# Patient Record
Sex: FEMALE | Race: WHITE | NOT HISPANIC OR LATINO | Employment: OTHER | URBAN - METROPOLITAN AREA
[De-identification: names, ages, dates, MRNs, and addresses within clinical notes are randomized per-mention and may not be internally consistent; named-entity substitution may affect disease eponyms.]

---

## 2017-12-18 ENCOUNTER — OFFICE VISIT (OUTPATIENT)
Dept: RHEUMATOLOGY | Facility: CLINIC | Age: 82
End: 2017-12-18
Payer: MEDICARE

## 2017-12-18 VITALS
WEIGHT: 154 LBS | BODY MASS INDEX: 29.07 KG/M2 | DIASTOLIC BLOOD PRESSURE: 62 MMHG | HEIGHT: 61 IN | SYSTOLIC BLOOD PRESSURE: 180 MMHG

## 2017-12-18 DIAGNOSIS — M19.90 CHRONIC INFLAMMATORY ARTHRITIS: ICD-10-CM

## 2017-12-18 DIAGNOSIS — M62.59 MUSCLE WASTING AND ATROPHY, NOT ELSEWHERE CLASSIFIED, MULTIPLE SITES: ICD-10-CM

## 2017-12-18 DIAGNOSIS — M35.3 PMR (POLYMYALGIA RHEUMATICA): Primary | ICD-10-CM

## 2017-12-18 LAB
ALBUMIN SERPL-MCNC: 3.8 G/DL (ref 3.1–4.7)
ALP SERPL-CCNC: 80 IU/L (ref 40–104)
ALT (SGPT): 18 IU/L (ref 3–33)
AST SERPL-CCNC: 18 IU/L (ref 10–40)
BASOPHILS NFR BLD: 0 K/UL (ref 0–0.2)
BASOPHILS NFR BLD: 0.3 %
BILIRUB SERPL-MCNC: 0.7 MG/DL (ref 0.3–1)
BUN SERPL-MCNC: 48 MG/DL (ref 8–20)
CALCIUM SERPL-MCNC: 9.8 MG/DL (ref 7.7–10.4)
CHLORIDE: 95 MMOL/L (ref 98–110)
CO2 SERPL-SCNC: 26.1 MMOL/L (ref 22.8–31.6)
CREATININE: 0.97 MG/DL (ref 0.6–1.4)
CRP SERPL-MCNC: 1.4 MG/DL (ref 0–1.4)
EOSINOPHIL NFR BLD: 0 K/UL (ref 0–0.7)
EOSINOPHIL NFR BLD: 0.2 %
ERYTHROCYTE [DISTWIDTH] IN BLOOD BY AUTOMATED COUNT: 15.7 % (ref 11.7–14.9)
GLUCOSE: 303 MG/DL (ref 70–99)
GRAN #: 4.1 K/UL (ref 1.4–6.5)
GRAN%: 68.7 %
HCT VFR BLD AUTO: 32 % (ref 36–48)
HGB BLD-MCNC: 10.1 G/DL (ref 12–15)
IMMATURE GRANS (ABS): 0.3 K/UL (ref 0–1)
IMMATURE GRANULOCYTES: 4.7 %
LYMPH #: 1.2 K/UL (ref 1.2–3.4)
LYMPH%: 20.8 %
MCH RBC QN AUTO: 31.5 PG (ref 25–35)
MCHC RBC AUTO-ENTMCNC: 31.6 G/DL (ref 31–36)
MCV RBC AUTO: 99.7 FL (ref 79–98)
MONO #: 0.3 K/UL (ref 0.1–0.6)
MONO%: 5.3 %
NUCLEATED RBCS: 0 %
PLATELET # BLD AUTO: 156 K/UL (ref 140–440)
PMV BLD AUTO: 9.6 FL (ref 8.8–12.7)
POTASSIUM SERPL-SCNC: 4.8 MMOL/L (ref 3.5–5)
PROT SERPL-MCNC: 6.8 G/DL (ref 6–8.2)
RBC # BLD AUTO: 3.21 M/UL (ref 3.5–5.5)
SODIUM: 134 MMOL/L (ref 134–144)
T4 FREE SP9 P CHAL SERPL-SCNC: 1.22 NG/DL (ref 0.45–1.27)
TSH SERPL DL<=0.005 MIU/L-ACNC: 0.28 ULU/ML (ref 0.3–5.6)
URATE SERPL-MCNC: 5.7 MG/DL (ref 2.6–7.8)
WBC # BLD AUTO: 5.9 K/UL (ref 5–10)

## 2017-12-18 PROCEDURE — 99204 OFFICE O/P NEW MOD 45 MIN: CPT | Mod: ,,, | Performed by: INTERNAL MEDICINE

## 2017-12-18 RX ORDER — NAPROXEN SODIUM 220 MG/1
81 TABLET, FILM COATED ORAL EVERY MORNING
COMMUNITY

## 2017-12-18 RX ORDER — ERGOCALCIFEROL 1.25 MG/1
50000 CAPSULE ORAL
COMMUNITY
Start: 2017-10-19

## 2017-12-18 RX ORDER — LISINOPRIL 10 MG/1
TABLET ORAL
COMMUNITY
Start: 2017-09-21 | End: 2019-08-16

## 2017-12-18 RX ORDER — PREDNISONE 5 MG/1
5 TABLET ORAL 3 TIMES DAILY
Qty: 90 TABLET | Refills: 0 | Status: SHIPPED | OUTPATIENT
Start: 2017-12-18 | End: 2018-01-31

## 2017-12-18 RX ORDER — METHYLPREDNISOLONE 4 MG/1
TABLET ORAL
COMMUNITY
Start: 2017-10-23 | End: 2017-12-18

## 2017-12-18 RX ORDER — INDOMETHACIN 25 MG/1
CAPSULE ORAL
COMMUNITY
Start: 2017-10-23 | End: 2018-01-31

## 2017-12-18 RX ORDER — ALISKIREN HEMIFUMARATE 300 MG/1
TABLET, FILM COATED ORAL
COMMUNITY
Start: 2017-09-25 | End: 2019-08-16

## 2017-12-18 RX ORDER — SIMVASTATIN 20 MG/1
TABLET, FILM COATED ORAL
COMMUNITY
End: 2019-08-16

## 2017-12-18 RX ORDER — HYDROCODONE BITARTRATE AND ACETAMINOPHEN 5; 325 MG/1; MG/1
TABLET ORAL
COMMUNITY
Start: 2017-10-16 | End: 2017-12-18 | Stop reason: ALTCHOICE

## 2017-12-18 RX ORDER — LEVOTHYROXINE SODIUM 150 UG/1
150 TABLET ORAL EVERY MORNING
COMMUNITY

## 2017-12-18 RX ORDER — HYDROCHLOROTHIAZIDE 25 MG/1
TABLET ORAL
COMMUNITY
End: 2019-08-16

## 2017-12-18 RX ORDER — AMLODIPINE BESYLATE 5 MG/1
TABLET ORAL
COMMUNITY
End: 2019-08-16

## 2017-12-18 RX ORDER — ALISKIREN 300 MG/1
TABLET, FILM COATED ORAL
COMMUNITY
End: 2017-12-18 | Stop reason: SDUPTHER

## 2017-12-18 RX ORDER — ESOMEPRAZOLE MAGNESIUM 40 MG/1
40 CAPSULE, DELAYED RELEASE ORAL EVERY MORNING
COMMUNITY

## 2017-12-18 RX ORDER — SOTALOL HYDROCHLORIDE 80 MG/1
80 TABLET ORAL 2 TIMES DAILY
COMMUNITY
Start: 2017-10-20

## 2017-12-18 NOTE — PROGRESS NOTES
[facility] RHEUMATOLOGY           New patient visit      Subjective:       Patient ID:   NAME: Vee Pizano : 1933     84 y.o. female    Referring Doc: No ref. provider found  Other Physicians:    Chief Complaint:  Consult (new patient - referred by Dr. Joseph); Joint Pain (7/10 neck, right arm); and Extremity Weakness        HPI:   This patient has had approximately 6 months of progressive pain and weakness involving both the upper and lower extremities. The patient states that it began shortly after the insertion of a permanent pacemaker in 2017. She had acute attack of pain in 1 foot. There was no redness, warmth or swelling but very significant pain Someone postulated gout as the cause and the patient was given Indocin (!) but did not note any significant benefit from that. She did however seem to spontaneously recover from that episode but soon developed an attack of bilateral foot pain, again without signs of local inflammation. At this point she was treated with a Medrol Dosepak and noted an excellent response from that. Since then, however, she has been basically confined to bed due to what sounds like weakness in the lower extremities. She does get up to a limited extent but only with a walker. Today she pain in her right wrist and in her right shoulder with some degree of swelling in the wrist. She is unable to estimate when this change began. It is however making it difficult for her to manage the walker and thus it is increasing the extent to which she is bedridden.      ROS:   GEN:      no fever, night sweats or weight loss  SKIN:     no rashes , erythema, bruising, or swelling, no Raynauds, no photosensitivity  HEENT: occ HAs, no acute changes in vision, no mouth ulcers, no sicca symptoms, no scalp tenderness, jaw claudication.  CV:         no CP, SOB, PND, CARNEY or orthopnea, + palpitations  PULM:   no SOB, cough, hemoptysis, sputum or pleuritic pain  GI:         no abdominal pain,  "nausea, vomiting, constipation, diarrhea, melanotic stools, BRBPR, or hematemesis.no dysphagia  :       no hematuria, dysuria  NEURO :no paresthesias, +/- headaches, no visual disturbances, ++ muscle weakness  MUSCULOSKELETAL:  Muscle pain in both upper and lower extremities without proximal preference. Pain and swelling of the right wrist and possibly the right shoulder  PSYCH: No insomnia, no significant depression, no anxiety    Medications:    Current Outpatient Prescriptions:     amLODIPine (NORVASC) 5 MG tablet, Take by mouth., Disp: , Rfl:     apixaban (ELIQUIS) 5 mg Tab, Take by mouth., Disp: , Rfl:     aspirin 81 MG Chew, Take by mouth., Disp: , Rfl:     CYANOCOBALAMIN, VITAMIN B-12, (VITAMIN B-12 ORAL), Take by mouth., Disp: , Rfl:     esomeprazole (NEXIUM) 40 MG capsule, Take by mouth., Disp: , Rfl:     hydroCHLOROthiazide (HYDRODIURIL) 25 MG tablet, Take by mouth., Disp: , Rfl:     indomethacin (INDOCIN) 25 MG capsule, , Disp: , Rfl:     levothyroxine (SYNTHROID) 125 MCG tablet, Take by mouth., Disp: , Rfl:     lisinopril 10 MG tablet, , Disp: , Rfl:     OXYBUTYNIN CHLORIDE ORAL, Take by mouth., Disp: , Rfl:     simvastatin (ZOCOR) 20 MG tablet, Take by mouth., Disp: , Rfl:     sotalol (BETAPACE) 80 MG tablet, , Disp: , Rfl:     TEKTURNA 300 mg Tab, , Disp: , Rfl:     VITAMIN D2 50,000 unit capsule, , Disp: , Rfl:     predniSONE (DELTASONE) 5 MG tablet, Take 1 tablet (5 mg total) by mouth 3 (three) times daily., Disp: 90 tablet, Rfl: 0    FAMILY HISTORY: negative for Connective Tissue Disease        Review of patient's allergies indicates:  No Known Allergies          Objective:     Vitals:  Blood pressure (!) 180/62, height 5' 1" (1.549 m), weight 69.9 kg (154 lb).    Physical Examination:   GEN:    wn/wd female in no apparent distress  SKIN:    no rashes, no lesions, no sclerodactyly, no Raynaud's, no periungual erythema  HEAD:   no alopecia, no scalp tenderness, no temporal artery " tenderness or induration.  EYES:   no pallor, no icterus, PERRLA  ENT:     no thrush, no mucosal dryness or ulcerations, adequate oral hygiene & dentition.  NECK:  supple x 6, no masses, no thyromegaly, no lymphadenopathy.  CV:        S1 and S2 regular, no murmurs, gallop or rubs  CHEST: Normal respiratory effort;  normal breath sounds/no adventitious sounds. No signs of consolidation.  ABD:      non-tender and non-distended; soft; normal bowel sounds; no rebound/guarding or tenderness. No hepatosplenomegaly.  Musculoskeletal:  Evidence of synovial proliferation is seen in the right second and third MCP joints with very mild tenderness on compression. There is no active synovitis in the small joints of the hands. Interosseous atrophy is prominent on the dorsum of both hands. The right wrist demonstrates significant chronic synovial proliferation without redness, warmth or swelling. There is a decrease in the range of motion of the right wrist. Range of motion of the elbows is full and there are no nodules noted. There is mild warmth and moderate crepitus on range of motion of the right shoulder though it is full. There is some spasm of the right trapezius. The impingement sign and the drop arm sign are both negative bilaterally. The examination of the lower extremities was limited to the patient being seated in a wheelchair. There is no obvious sign of hip disease. The left knee appears to be healing well from a recent left TKR. There is no warmth noted in the ankles. There is mild metatarsalgia present on the right The Homans signs are negative bilaterally. Strength in the upper extremities is 4+/5x2 while strength in the lower extremities is 3/5×2. I was unable to study posture, range of motion of the back or gait.  EXTREM: no clubbing or cyanosis, ++ bipedal edema. normal pulses.  NEURO: grossly intact; motor/sensory WNL; AAOx3; no tremors  PSYCH:  normal mood, affect and behavior            Labs:   Lab Results    Component Value Date    WBC 4.89 03/06/2008    HGB 10.4 (L) 03/06/2008    HCT 32.6 (L) 03/06/2008    MCV 94.5 03/06/2008     03/06/2008   CMP@  Sodium   Date Value Ref Range Status   09/25/2008 142 136 - 145 mMol/l Final     Potassium   Date Value Ref Range Status   09/25/2008 4.0 3.5 - 5.1 mMol/l Final     Chloride   Date Value Ref Range Status   09/25/2008 103 95 - 110 mMol/l Final     CO2   Date Value Ref Range Status   09/25/2008 23 23.0 - 29.0 mEq/L Final     Glucose   Date Value Ref Range Status   09/25/2008 122 (H) 70 - 110 mg/dl Final     BUN, Bld   Date Value Ref Range Status   09/25/2008 34 (H) 8 - 23 mg/dl Final     Creatinine   Date Value Ref Range Status   09/25/2008 0.9 0.5 - 1.4 mg/dl Final     Calcium   Date Value Ref Range Status   09/25/2008 9.8 8.7 - 10.5 mg/dl Final     Total Protein   Date Value Ref Range Status   09/25/2008 7.3 6.0 - 8.4 gm/dl Final     Albumin   Date Value Ref Range Status   09/25/2008 4.7 3.5 - 5.2 g/dl Final     Total Bilirubin   Date Value Ref Range Status   09/25/2008 0.5 0.1 - 1.0 mg/dl Final     Comment:     For infants and newborns, interpretation of results should be based  on gestational age, weight and in agreement with clinical  observations.  .  Premature Infant recommended reference ranges:  Up to 24 hours.............<8.0 mg/dl  Up to 48 hours............<12.0 mg/dl  3-5 days..................<15.0 mg/dl  6-29 days.................<15.0 mg/dl       Alkaline Phosphatase   Date Value Ref Range Status   09/25/2008 71 55 - 135 U/L Final     AST   Date Value Ref Range Status   09/25/2008 15 10 - 40 U/L Final     ALT   Date Value Ref Range Status   09/25/2008 10 10 - 44 U/L Final         Radiology/Diagnostic Studies:    none    Assessment/Discussion/Plan:   84 y.o. female with subacute onset of muscle pain and weakness, briefly responsive to steroids but not responsive to anti-inflammatories-most likely consistent with polymyalgia rheumatica  (2) Synovitis of  the right wrist by examination, highly suggestive of rheumatoid arthritis    PLAN:  Ad I not found the synovial proliferation of the right wrist, I would have been certain that this is PMR alone. However, synovitis as such and a peripheral joint is almost always to underlying inflammatory arthritis. Be that as it may, the initial treatment for both processes is steroids. I am therefore going to start her on prednisone 5 mg 3 times daily until she is seen in 1 month. All appropriate blood testing was performed today including acute phase reactants, a rheumatoid factor and a CCP, a TSH level and a uric acid.  I have provided the patient and her daughter with literature on both PMR and on RA.  I have mentioned that in January, I will most likely taper the prednisone a bit if it turns out that this presentation is entirely consistent with PMR, or that I will add a DMARD if it turns out that there is a significant rheumatoid component to it.    I have made certain that they stop Indocin as it is far too toxic for her to take.    RTC:  I will see her back in 4 weeks.      Electronically signed by Rashad Hinson MD

## 2017-12-18 NOTE — PATIENT INSTRUCTIONS
Rheumatoid Arthritis  You have rheumatoid arthritis (RA). This is a chronic disease that mainly affects the joints. Sometimes, it also affects other parts of the body. RA is an autoimmune disease. This means that the bodys immune system, which normally protects the body, causes harm instead. With RA, the immune system attacks the joints. The reason for this is unknown.  In most cases, RA affects pairs of joints on both sides of the body. These can include joints in both elbows, wrists, hands, knees, feet, or ankles. The disease often starts slowly. Early symptoms include stiffness, muscle aches, weakness, and fatigue. Over time, the joints may begin to hurt. They may also become warm, swollen, or tender. Symptoms may feel worse in the morning after a nights rest and may get better with activity.  With RA, you may have periods of active disease (when symptoms worsen) followed by periods of remission (when symptoms improve or go away). There is no known cure for RA. But medical treatment can slow or stop the progress of the disease. It can also help relieve symptoms. For advanced disease, surgery, such as joint replacement, may be the best option.  Home care  If you were prescribed a medicine, take it as directed.  To help control swelling and pain, acetaminophen, ibuprofen, or another NSAID (non-steroidal anti-inflammatory drug) may be recommended. Note: If you have chronic liver or kidney disease or ever had a stomach ulcer or gastrointestinal bleeding, tell your healthcare provider before taking any of these medicines.  Some persons find relief with heat (hot shower, hot bath, or heating pad). Others prefer cold (ice in a plastic bag, wrapped in a towel). Try both. Then use the method you like best. Use heat or cold for about 20 minutes, a few times a day.  Exercise is a key part of treatment for RA. It helps reduce pain. It may also improve flexibility. Do your best to be active daily. Move your joints through  their full range of motion each morning. Avoid staying in any one position for long periods of time. Take breaks throughout the day and move around. Also, ask your healthcare provider or physical therapist what exercises are best for you.  If you are overweight, lose weight. Extra weight puts stress on your joints.  If you smoke, quit. Smoking raises the risk of other problems linked to RA.  No herbal product or nutritional supplement has been proven to help RA. But treatments such as acupuncture and massage may help relieve pain.  Talk to your healthcare provider or occupational therapist about easier ways to perform daily tasks. This may include the use of assistive devices. These are special tools that can help with things like dressing, bathing, cooking, driving, and moving or getting around.  Follow-up care  Follow up with your healthcare provider, or as advised.   When to seek medical advice  Call your healthcare provider right away if any of these occur:  Increasing weakness, pale color of the skin, fainting  Chest pain or shortness of breath  Blood in vomit or stool (black or red color)  Changes in vision  Skin ulcers  Fever of 100.4ºF (38ºC) or higher, or as directed by your healthcare provider  New joint pain  New rash  Resources  To learn more about RA, contact:  Arthritis Foundation, 968.486.7036, www.arthritis.org  National [place] of Arth[employer] and Musculoskeletal and Skin Diseases (NIAMS), 187.429.2556, www.niams.nih.gov     Date Last Reviewed: 3/1/2017  © 8844-0408 [employer]. [address] [place] [address], [place] [address]. All rights reserved. This information is not intended as a substitute for professional medical care. Always follow your healthcare professional's instructions.        Treatment for Polymyalgia Rheumatica  Polymyalgia rheumatica (PMR) is an inflammatory condition that can cause aching and stiffness. It tends to affect the neck, shoulders, and hips. The aching and  stiffness are usually worse in the morning.  Types of treatment  Steroid medicine is the main treatment for PMR. Your healthcare provider will start you on a low dose of this medicine. You should start to feel better soon after starting. When your symptoms are better, your healthcare provider will slowly lower the amount of medicine. If your symptoms return, he or she will increase the dose. You may need to take steroid medicine for a few years. Return of symptoms is common, so you may need to take steroid medicine again in the future. If untreated, PMR may go away on its own after several years. But symptoms will likely return.  Watching for giant cell arteritis  Some people with PMR also have a condition called giant cell arteritis. It is also called temporal arteritis or Paez arteritis. This is inflammation of blood vessels in the head, neck, and arms. This can narrow or block the blood vessels. It can cause problems from less blood flow through those vessels. Giant cell arteritis can cause symptoms such as:  · Headaches  · Changes in vision  · Jaw pain, especially when chewing  · Scalp pain  · Scalp sores (ulcers)  · High fevers  Possible complications of giant cell arteritis may include blindness or stroke. Giant cell arteritis can also be treated with steroid medicine.  Risks of long-term steroid use  Steroid medicine has some risks. Talk with your healthcare provider about the risks and benefits for you. Some of the possible risks of taking steroids for a long time can include:  · High blood pressure  · Diabetes  · Glaucoma  · Cataracts  · Osteoporosis  · Fluid retention  · Weight gain  · Roundness of the face  · Stomach irritation  · Trouble sleeping  · Muscle wasting  · Skin thinning  · Easy bruising  · Poor wound healing  · Higher risk for infections  Living with polymyalgia rheumatica  If you have PMR, your symptoms will get better with treatment. Once you start feeling better, you can return to your  normal activities. Your healthcare provider will track your symptoms and adjust your steroid dose until you are on the lowest dose needed. Small changes in steroid doses can have a big effect on your symptoms. Make sure to follow your healthcare providers instructions.  When to call 911  Call 911 if you have symptoms of a stroke, such as:  · Severe headache  · Trouble seeing  · Balance or coordination problems  · Weakness or numbness  · Confusion  · Trouble speaking  If you think you are having a stroke, note the time when your symptoms started.      When to call your healthcare provider  Call your healthcare provider if you have any of the following:  · Symptoms that dont get better with treatment  · Symptoms that get worse  · Symptoms of giant cell arteritis   Date Last Reviewed: 8/10/2015  © 3790-1514 [employer]. [address] [place] [address], [place] [address]. All rights reserved. This information is not intended as a substitute for professional medical care. Always follow your healthcare professional's instructions.

## 2017-12-19 NOTE — PROGRESS NOTES
Please find partial labs of our mutual patient appended. Her blood sugar and her TSH need attention. Thanks

## 2017-12-20 LAB
CCP ANTIBODIES IGG/IGA: 3 UNITS (ref 0–19)
RHEUMATOID FACT SERPL-ACNC: <10 IU/ML (ref 0–13.9)

## 2018-01-17 ENCOUNTER — TELEPHONE (OUTPATIENT)
Dept: RHEUMATOLOGY | Facility: CLINIC | Age: 83
End: 2018-01-17

## 2018-01-17 NOTE — TELEPHONE ENCOUNTER
I spoke with her daughter, instructed her to take the patient to the ER or urgent care now if she feels the patient is unstable in any way. Ms. Thomas verbalizes understanding of instructions. An appt has been scheduled for tomorrow. Nystatin solution called into [facility] pharmacy. Ms. Thomas verbalizes understanding of nystatin instructions to swish and expectorate 5 ml 4 times a day.

## 2018-01-17 NOTE — TELEPHONE ENCOUNTER
If the daughter feels she is in any way unstable, she should be taken to the emergency room or an urgent care center now. Otherwise, we will prescribe a nystatin solution for her to swish and expectorate, 5 mL 4 times a day. I will see her for her appointment tomorrow.

## 2018-01-17 NOTE — TELEPHONE ENCOUNTER
Ms. Campa daughter reports the patient was doing well up until about a week ago. She will not eat because her mouth feels tore up. Its raw and hurts to eat. They have been giving her soft foods like yogurt, etc. The patient states she has lost her legs. She is unable to walk. She is weak. And she feels so dizzy she can't sit up for long.  She had an appt for today. Her daughter thinks maybe they can get her here tomorrow.  Please advise.

## 2018-01-31 ENCOUNTER — OFFICE VISIT (OUTPATIENT)
Dept: RHEUMATOLOGY | Facility: CLINIC | Age: 83
End: 2018-01-31
Payer: MEDICARE

## 2018-01-31 VITALS
WEIGHT: 140 LBS | SYSTOLIC BLOOD PRESSURE: 110 MMHG | BODY MASS INDEX: 23.9 KG/M2 | HEIGHT: 64 IN | DIASTOLIC BLOOD PRESSURE: 60 MMHG

## 2018-01-31 DIAGNOSIS — R29.898 MUSCULAR DECONDITIONING: ICD-10-CM

## 2018-01-31 DIAGNOSIS — E11.9 TYPE 2 DIABETES MELLITUS WITHOUT COMPLICATION, WITHOUT LONG-TERM CURRENT USE OF INSULIN: ICD-10-CM

## 2018-01-31 DIAGNOSIS — M19.90 OSTEOARTHRITIS, UNSPECIFIED OSTEOARTHRITIS TYPE, UNSPECIFIED SITE: Primary | ICD-10-CM

## 2018-01-31 PROCEDURE — 1159F MED LIST DOCD IN RCRD: CPT | Mod: ,,, | Performed by: INTERNAL MEDICINE

## 2018-01-31 PROCEDURE — 99215 OFFICE O/P EST HI 40 MIN: CPT | Mod: ,,, | Performed by: INTERNAL MEDICINE

## 2018-01-31 PROCEDURE — 1125F AMNT PAIN NOTED PAIN PRSNT: CPT | Mod: ,,, | Performed by: INTERNAL MEDICINE

## 2018-01-31 RX ORDER — GABAPENTIN 100 MG/1
100 CAPSULE ORAL 3 TIMES DAILY
COMMUNITY
End: 2018-03-26 | Stop reason: SDUPTHER

## 2018-01-31 NOTE — PROGRESS NOTES
[facility] RHEUMATOLOGY           Follow-up visit      Subjective:       Patient ID:   NAME: Vee Pizano : 1933     84 y.o. female    Referring Doc: No ref. provider found  Other Physicians:    Chief Complaint:  No chief complaint on file.      HPI/Interval History:    After her last appointment, she some sort of GI problem that led to significant dehydration. She was hospitalized for that for several days. The hospital labs demonstrated an even higher blood sugar than the one that she had in my office (her serum glucose level when seen here was 303 milligrams per deciliter. This was before she had taken any steroids. That results had been sent to her referring physician for follow-up). She was unable to fend for herself after just several days of bed rest and was therefore sent to a rehabilitation facility. They are they are giving her insulin twice daily and trying to get her ambulatory again. She is of course off the steroids that were prescribed for her and currently is getting a pain pill when she remembers to ask for it.          ROS:   GEN:    no fever, night sweats or weight loss  SKIN:   no rashes , erythema, bruising, or swelling, no Raynauds, no photosensitivity  HEENT: no HAs, no changes in vision, no mouth ulcers, no sicca symptoms, no scalp tenderness, jaw claudication.  CV:      no CP, SOB, PND, CARNEY or orthopnea,no palpitations  PULM: no SOB, cough, hemoptysis, sputum or pleuritic pain  GI:      no abdominal pain, nausea, vomiting, constipation, diarrhea, melanotic stools, BRBPR, or hematemesis, no dysphagia, no GERD  :     no hematuria, dysuria  NEURO: no paresthesias, headaches, visual disturbances  MUSCULOSKELETAL:  Pain and weakness predominantly affecting the lower extremities now. Also significant lower extremity swelling  PSYCH:  significant depression    Medications:    Current Outpatient Prescriptions:     amLODIPine (NORVASC) 5 MG tablet, Take by mouth., Disp: , Rfl:      "apixaban (ELIQUIS) 5 mg Tab, Take by mouth., Disp: , Rfl:     aspirin 81 MG Chew, Take by mouth., Disp: , Rfl:     CYANOCOBALAMIN, VITAMIN B-12, (VITAMIN B-12 ORAL), Take by mouth., Disp: , Rfl:     esomeprazole (NEXIUM) 40 MG capsule, Take by mouth., Disp: , Rfl:     gabapentin (NEURONTIN) 100 MG capsule, Take 100 mg by mouth 3 (three) times daily., Disp: , Rfl:     hydroCHLOROthiazide (HYDRODIURIL) 25 MG tablet, Take by mouth., Disp: , Rfl:     indomethacin (INDOCIN) 25 MG capsule, , Disp: , Rfl:     levothyroxine (SYNTHROID) 125 MCG tablet, Take by mouth., Disp: , Rfl:     lisinopril 10 MG tablet, , Disp: , Rfl:     OXYBUTYNIN CHLORIDE ORAL, Take by mouth., Disp: , Rfl:     predniSONE (DELTASONE) 5 MG tablet, Take 1 tablet (5 mg total) by mouth 3 (three) times daily., Disp: 90 tablet, Rfl: 0    simvastatin (ZOCOR) 20 MG tablet, Take by mouth., Disp: , Rfl:     sotalol (BETAPACE) 80 MG tablet, , Disp: , Rfl:     TEKTURNA 300 mg Tab, , Disp: , Rfl:     VITAMIN D2 50,000 unit capsule, , Disp: , Rfl:       FAMILY HISTORY: negative for Connective Tissue Disease        Review of patient's allergies indicates:  No Known Allergies          Objective:     Vitals:  Blood pressure 110/60, height 5' 4" (1.626 m), weight 63.5 kg (140 lb).    Physical Examination:   GEN: wn/wd female in no apparent distress  SKIN: no rashes, no lesions, no sclerodactyly, no Raynaud's, no periungual erythema  HEAD: no alopecia, no scalp tenderness, no temporal artery tenderness or induration.  EYES: + pallor, no icterus, PERRLA  ENT:  no thrush, no mucosal dryness or ulcerations, adequate oral hygiene & dentition.  NECK: supple x 6, no masses, no thyromegaly, no lymphadenopathy.  CV:   S1 and S2 regular, no murmurs, gallop or rubs  CHEST: Normal respiratory effort;  normal breath sounds/no adventitious sounds. No signs of consolidation.  ABD: non-tender and non-distended; soft; normal bowel sounds; no rebound/guarding or " tenderness. No hepatosplenomegaly.  Musculoskeletal:  Strength is 4/5×2 in the upper extremities and could not be evaluated in the lower extremities because of her pain level. She has 2+ pitting edema from the knees down.  EXTREM: no clubbing or cyanosis. 2 + edema, normal pulses. Homans negative  NEURO: grossly intact; motor/sensory WNL; AAOx3; no tremors  PSYCH:  Depressed, moaning, but oriented ×3 with no evidence of psychosis        Labs:   Lab Results   Component Value Date    WBC 5.9 12/18/2017    HGB 10.1 (L) 12/18/2017    HCT 32.0 (L) 12/18/2017    MCV 99.7 (H) 12/18/2017     12/18/2017   CMP@  Sodium   Date Value Ref Range Status   12/18/2017 134 134 - 144 mmol/L      Potassium   Date Value Ref Range Status   12/18/2017 4.8 3.5 - 5.0 mmol/L      Chloride   Date Value Ref Range Status   12/18/2017 95 (L) 98 - 110 mmol/L      CO2   Date Value Ref Range Status   12/18/2017 26.1 22.8 - 31.6 mmol/L      Glucose   Date Value Ref Range Status   12/18/2017 303 (H) 70 - 99 mg/dL      BUN, Bld   Date Value Ref Range Status   12/18/2017 48 (H) 8 - 20 mg/dL      Creatinine   Date Value Ref Range Status   12/18/2017 0.97 0.60 - 1.40 mg/dL      Calcium   Date Value Ref Range Status   12/18/2017 9.8 7.7 - 10.4 mg/dL      Total Protein   Date Value Ref Range Status   12/18/2017 6.8 6.0 - 8.2 g/dL      Albumin   Date Value Ref Range Status   12/18/2017 3.8 3.1 - 4.7 g/dL      Total Bilirubin   Date Value Ref Range Status   12/18/2017 0.7 0.3 - 1.0 mg/dL      Alkaline Phosphatase   Date Value Ref Range Status   12/18/2017 80 40 - 104 IU/L      AST   Date Value Ref Range Status   12/18/2017 18 10 - 40 IU/L      ALT   Date Value Ref Range Status   09/25/2008 10 10 - 44 U/L Final     CRP   Date Value Ref Range Status   12/18/2017 1.40 0.00 - 1.40 mg/dL      Rheumatoid Factor   Date Value Ref Range Status   12/18/2017 <10.0 0.0 - 13.9 IU/mL      Comment:     Performed at: [facility], [facility] [id number] [facility],  [place], [place], [id number] Arce MD, Phone:  [phone]     Uric Acid   Date Value Ref Range Status   12/18/2017 5.7 2.6 - 7.8 mg/dL          Radiology/Diagnostic Studies:    none    Assessment/Discussion/Plan:   84 y.o. female with a clinical picture of polymyalgia rheumatica, unable to tolerate steroids given her diabetes.       PLAN:  I discussed the problems with the patient and her daughter as I see them now. She is in rehabilitation and they are attempting to get her to exercise cooperatively. She is in too much pain to cooperate. She does have pain medications ordered. I have instructed the daughter to be certain that she gets her pain medication 30 minutes before her physical therapy is scheduled each day. My hope is that with that analgesic, she will be able to participate more comfortably in her rehabilitation.  I also stressed that it is vital that they find someone who can take care of her diabetes immediately. I have given them the name of my colleague, Dr. Nicholas Jaimes. I will send a copy of this note to him and hope that he will agree to get this lady scheduled as soon as possible. I have also advised them to make an appointment with one of our endocrinologists. Those appointments are typically 4 months out or more and so it would be wise to have that on hand should Dr. Jaimes feel that he could use more input.      RTC:  I would like to see her back as soon as her sugars are controlled and I can proceed with addressing her PMR.      Electronically signed by Rashad Hinson MD

## 2018-03-20 ENCOUNTER — TELEPHONE (OUTPATIENT)
Dept: RHEUMATOLOGY | Facility: CLINIC | Age: 83
End: 2018-03-20

## 2018-03-20 DIAGNOSIS — Z79.899 ENCOUNTER FOR LONG-TERM (CURRENT) USE OF HIGH-RISK MEDICATION: ICD-10-CM

## 2018-03-20 DIAGNOSIS — Z79.899 NEED FOR PROPHYLACTIC CHEMOTHERAPY: ICD-10-CM

## 2018-03-20 DIAGNOSIS — M35.3 POLYMYALGIA RHEUMATICA: Primary | ICD-10-CM

## 2018-03-22 ENCOUNTER — TELEPHONE (OUTPATIENT)
Dept: UROLOGY | Facility: CLINIC | Age: 83
End: 2018-03-22

## 2018-03-22 NOTE — TELEPHONE ENCOUNTER
----- Message from Jackie Staley sent at 3/22/2018  9:26 AM CDT -----  Contact: Pt daughter Tamera Philip needs to schedule a 1 week hospital f/u visit with Dr Cooper, says pt was told to call and schedule a visit nothing available until May.     Tamera can be reached  at [phone]    Thanks

## 2018-03-22 NOTE — TELEPHONE ENCOUNTER
Spoke with patient's daughter, patient seen by MD at [facility], has catheter and is now in [place] rehab. Hospital follow up appt made, she verbally understood.

## 2018-03-26 ENCOUNTER — OFFICE VISIT (OUTPATIENT)
Dept: RHEUMATOLOGY | Facility: CLINIC | Age: 83
End: 2018-03-26
Payer: MEDICARE

## 2018-03-26 ENCOUNTER — TELEPHONE (OUTPATIENT)
Dept: UROLOGY | Facility: CLINIC | Age: 83
End: 2018-03-26

## 2018-03-26 VITALS — WEIGHT: 154 LBS | SYSTOLIC BLOOD PRESSURE: 100 MMHG | BODY MASS INDEX: 26.43 KG/M2 | DIASTOLIC BLOOD PRESSURE: 62 MMHG

## 2018-03-26 DIAGNOSIS — Z79.899 ENCOUNTER FOR LONG-TERM (CURRENT) DRUG USE: ICD-10-CM

## 2018-03-26 DIAGNOSIS — E11.9 TYPE 2 DIABETES MELLITUS WITHOUT COMPLICATION, WITHOUT LONG-TERM CURRENT USE OF INSULIN: ICD-10-CM

## 2018-03-26 DIAGNOSIS — R29.898 MUSCULAR DECONDITIONING: ICD-10-CM

## 2018-03-26 DIAGNOSIS — M35.3 PMR (POLYMYALGIA RHEUMATICA): Primary | ICD-10-CM

## 2018-03-26 LAB
ALBUMIN SERPL-MCNC: 2.7 G/DL (ref 3.1–4.7)
ALP SERPL-CCNC: 60 IU/L (ref 40–104)
ALT (SGPT): 10 IU/L (ref 3–33)
AST SERPL-CCNC: 17 IU/L (ref 10–40)
BASOPHILS NFR BLD: 0.1 K/UL (ref 0–0.2)
BASOPHILS NFR BLD: 1 %
BILIRUB SERPL-MCNC: 0.7 MG/DL (ref 0.3–1)
BUN SERPL-MCNC: 11 MG/DL (ref 8–20)
CALCIUM SERPL-MCNC: 8.7 MG/DL (ref 7.7–10.4)
CHLORIDE: 100 MMOL/L (ref 98–110)
CO2 SERPL-SCNC: 26.2 MMOL/L (ref 22.8–31.6)
CREATININE: 0.57 MG/DL (ref 0.6–1.4)
CRP SERPL-MCNC: 2.28 MG/DL (ref 0–1.4)
EOSINOPHIL NFR BLD: 0.1 K/UL (ref 0–0.7)
EOSINOPHIL NFR BLD: 0.7 %
ERYTHROCYTE [DISTWIDTH] IN BLOOD BY AUTOMATED COUNT: 15.2 % (ref 11.7–14.9)
GLUCOSE: 110 MG/DL (ref 70–99)
GRAN #: 5.7 K/UL (ref 1.4–6.5)
GRAN%: 57.7 %
HCT VFR BLD AUTO: 40.1 % (ref 36–48)
HGB BLD-MCNC: 12.5 G/DL (ref 12–15)
IMMATURE GRANS (ABS): 0.2 K/UL (ref 0–1)
IMMATURE GRANULOCYTES: 2.4 %
LYMPH #: 3.2 K/UL (ref 1.2–3.4)
LYMPH%: 32.7 %
MCH RBC QN AUTO: 30.6 PG (ref 25–35)
MCHC RBC AUTO-ENTMCNC: 31.2 G/DL (ref 31–36)
MCV RBC AUTO: 98 FL (ref 79–98)
MONO #: 0.5 K/UL (ref 0.1–0.6)
MONO%: 5.5 %
NUCLEATED RBCS: 0 %
PLATELET # BLD AUTO: 312 K/UL (ref 140–440)
PMV BLD AUTO: 9.3 FL (ref 8.8–12.7)
POTASSIUM SERPL-SCNC: 3.1 MMOL/L (ref 3.5–5)
PROT SERPL-MCNC: 5.4 G/DL (ref 6–8.2)
RBC # BLD AUTO: 4.09 M/UL (ref 3.5–5.5)
SODIUM: 138 MMOL/L (ref 134–144)
WBC # BLD AUTO: 9.8 K/UL (ref 5–10)

## 2018-03-26 PROCEDURE — 99214 OFFICE O/P EST MOD 30 MIN: CPT | Mod: ,,, | Performed by: INTERNAL MEDICINE

## 2018-03-26 RX ORDER — GLUCOSAM/CHON-MSM1/C/MANG/BOSW 500-416.6
TABLET ORAL
COMMUNITY
Start: 2018-02-08 | End: 2019-08-16

## 2018-03-26 RX ORDER — INSULIN ASPART 100 [IU]/ML
5 INJECTION, SOLUTION INTRAVENOUS; SUBCUTANEOUS 3 TIMES DAILY PRN
COMMUNITY
Start: 2018-02-08

## 2018-03-26 RX ORDER — OXYBUTYNIN CHLORIDE 15 MG/1
TABLET, EXTENDED RELEASE ORAL
COMMUNITY
Start: 2018-02-15 | End: 2018-04-03 | Stop reason: ALTCHOICE

## 2018-03-26 RX ORDER — INSULIN GLARGINE 100 [IU]/ML
10 INJECTION, SOLUTION SUBCUTANEOUS DAILY PRN
COMMUNITY
Start: 2018-02-08

## 2018-03-26 RX ORDER — ALLOPURINOL 100 MG/1
100 TABLET ORAL 2 TIMES DAILY
COMMUNITY
Start: 2018-02-15

## 2018-03-26 RX ORDER — CALCIUM CITRATE/VITAMIN D3 200MG-6.25
TABLET ORAL
COMMUNITY
Start: 2018-02-08 | End: 2019-08-16

## 2018-03-26 RX ORDER — PREDNISONE 2.5 MG/1
2.5 TABLET ORAL 3 TIMES DAILY
Qty: 90 TABLET | Refills: 3 | Status: SHIPPED | OUTPATIENT
Start: 2018-03-26 | End: 2018-06-28 | Stop reason: SDUPTHER

## 2018-03-26 RX ORDER — PREDNISONE 5 MG/1
TABLET ORAL
COMMUNITY
Start: 2018-02-08 | End: 2018-03-26

## 2018-03-26 RX ORDER — GABAPENTIN 100 MG/1
100 CAPSULE ORAL 3 TIMES DAILY
Qty: 90 CAPSULE | Refills: 3 | Status: SHIPPED | OUTPATIENT
Start: 2018-03-26

## 2018-03-26 RX ORDER — TRAMADOL HYDROCHLORIDE 50 MG/1
50 TABLET ORAL EVERY 4 HOURS PRN
COMMUNITY
Start: 2018-02-08

## 2018-03-26 RX ORDER — LOSARTAN POTASSIUM 50 MG/1
50 TABLET ORAL EVERY MORNING
Status: ON HOLD | COMMUNITY
Start: 2018-03-09 | End: 2019-08-22 | Stop reason: HOSPADM

## 2018-03-26 NOTE — TELEPHONE ENCOUNTER
----- Message from RT Chinyere sent at 3/26/2018  9:29 AM CDT -----  Contact: SAM Goldsmith, [phone] [facility]  SAM Goldsmith, [phone] [facility], requesting a call back soon to review  the pt's preparations with the graves cathether prior to procedure, nathan

## 2018-03-26 NOTE — PROGRESS NOTES
[facility] RHEUMATOLOGY           Follow-up visit      Subjective:       Patient ID:   NAME: Vee Pizano : 1933     84 y.o. female    Referring Doc: No ref. provider found  Other Physicians:    Chief Complaint:  Osteoarthritis (Needs Refills, patient is at [facility] [place] needs Rx's sent there for the time being)      HPI from prior visit of 18: After her last appointment, she some sort of GI problem that led to significant dehydration. She was hospitalized for that for several days. The hospital labs demonstrated an even higher blood sugar than the one that she had in my office (her serum glucose level when seen here was 303 milligrams per deciliter. This was before she had taken any steroids. That results had been sent to her referring physician for follow-up). She was unable to fend for herself after just several days of bed rest and was therefore sent to a rehabilitation facility. They are they are giving her insulin twice daily and trying to get her ambulatory again. She is of course off the steroids that were prescribed for her and currently is getting a pain pill when she remembers to ask for it.      HPI/Interval History 3/26/18:   The patient returns today on a visit from her short-term care facility, [facility]. She is accompanied by her daughter. The patient states that she has been in agonizing pain diffusely over the last month. She had never started the prednisone that I had prescribed for her because she was found to be extremely hyperglycemic. During her rehabilitation, and the daughter informs me that a physician did put her on relatively high-dose prednisone (possibly 60 mg daily) and she responded well in the sense that she was pain-free and was able to sit up without assistance. The daughter is hoping that I can replicate that response today by putting her on enough prednisone that she blossoms as she had that day.          ROS:   GEN:    no fever,  night sweats or weight loss  SKIN:   no rashes , erythema, bruising, or swelling, no Raynauds, no photosensitivity  HEENT: no HAs, no changes in vision, no mouth ulcers, no sicca symptoms, no scalp tenderness, jaw claudication.  CV:      no CP, SOB, PND, CARNEY or orthopnea,no palpitations  PULM: no SOB, cough, hemoptysis, sputum or pleuritic pain  GI:      no abdominal pain, nausea, vomiting, constipation, diarrhea, melanotic stools, BRBPR, or hematemesis, no dysphagia, no GERD  :     no hematuria, dysuria  NEURO: no paresthesias, headaches, visual disturbances  MUSCULOSKELETAL:  Pain throughout the upper and lower extremities as well as in the low back  PSYCH:   ? insomnia, very significant depression & anxiety    Medications:    Current Outpatient Prescriptions:     allopurinol (ZYLOPRIM) 100 MG tablet, , Disp: , Rfl:     amLODIPine (NORVASC) 5 MG tablet, Take by mouth., Disp: , Rfl:     apixaban (ELIQUIS) 5 mg Tab, Take by mouth., Disp: , Rfl:     aspirin 81 MG Chew, Take by mouth., Disp: , Rfl:     CYANOCOBALAMIN, VITAMIN B-12, (VITAMIN B-12 ORAL), Take by mouth., Disp: , Rfl:     esomeprazole (NEXIUM) 40 MG capsule, Take by mouth., Disp: , Rfl:     gabapentin (NEURONTIN) 100 MG capsule, Take 100 mg by mouth 3 (three) times daily., Disp: , Rfl:     hydroCHLOROthiazide (HYDRODIURIL) 25 MG tablet, Take by mouth., Disp: , Rfl:     LANTUS U-100 INSULIN 100 unit/mL injection, , Disp: , Rfl:     levothyroxine (SYNTHROID) 125 MCG tablet, Take by mouth., Disp: , Rfl:     lisinopril 10 MG tablet, , Disp: , Rfl:     losartan (COZAAR) 50 MG tablet, , Disp: , Rfl:     NOVOLOG FLEXPEN U-100 INSULIN 100 unit/mL InPn pen, , Disp: , Rfl:     oxybutynin (DITROPAN XL) 15 MG TR24, , Disp: , Rfl:     predniSONE (DELTASONE) 2.5 MG tablet, Take 1 tablet (2.5 mg total) by mouth 3 (three) times daily., Disp: 90 tablet, Rfl: 3    simvastatin (ZOCOR) 20 MG tablet, Take by mouth., Disp: , Rfl:     sotalol (BETAPACE) 80 MG  tablet, , Disp: , Rfl:     TEKTURNA 300 mg Tab, , Disp: , Rfl:     traMADol (ULTRAM) 50 mg tablet, , Disp: , Rfl:     TRUE METRIX GLUCOSE TEST STRIP Strp, , Disp: , Rfl:     TRUEPLUS LANCETS 28 gauge Misc, , Disp: , Rfl:     VITAMIN D2 50,000 unit capsule, , Disp: , Rfl:       FAMILY HISTORY: negative for Connective Tissue Disease        Review of patient's allergies indicates:  No Known Allergies          Objective:     Vitals:  Blood pressure 100/62, weight 69.9 kg (154 lb).    Physical Examination:   GEN: elderly female seated uncomfortably, in pain, in a wheelchair  SKIN: no rashes, no lesions, no sclerodactyly, no Raynaud's, no periungual erythema  HEAD: +/- alopecia, no scalp tenderness, no temporal artery tenderness or induration.  EYES: + pallor, no icterus, PERRLA  ENT:  no thrush, no mucosal dryness or ulcerations  NECK: supple x 6, no masses, no thyromegaly, no lymphadenopathy.  CV:   S1 and S2 regular, no murmurs, gallop or rubs  CHEST: Normal respiratory effort;  normal breath sounds/no adventitious sounds. No signs of consolidation.  ABD: non-tender and non-distended; soft; normal bowel sounds; no rebound/guarding or tenderness. No hepatosplenomegaly.  Musculoskeletal:  No evidence of active inflammatory arthritis. Weakness is 4-/5×2 in the upper extremities; strength in the lower extremities could not accurately be examined. There is tenderness almost everywhere including proximal muscle insertions, moderate and symmetrical muscle atrophy is present in the upper and lower extremities, both proximally and distally  EXTREM: no clubbing, cyanosis or edema. normal pulses.  NEURO: grossly intact; motor/sensory WNL; AAOx3; no tremors  PSYCH:  Pain with intermittent tearfulness, she is fully oriented and able to answer appropriately, there is no evidence of any acute psychotic ideation            Labs:   Lab Results   Component Value Date    WBC 5.9 12/18/2017    HGB 10.1 (L) 12/18/2017    HCT 32.0 (L)  12/18/2017    MCV 99.7 (H) 12/18/2017     12/18/2017   CMP@  Sodium   Date Value Ref Range Status   12/18/2017 134 134 - 144 mmol/L      Potassium   Date Value Ref Range Status   12/18/2017 4.8 3.5 - 5.0 mmol/L      Chloride   Date Value Ref Range Status   12/18/2017 95 (L) 98 - 110 mmol/L      CO2   Date Value Ref Range Status   12/18/2017 26.1 22.8 - 31.6 mmol/L      Glucose   Date Value Ref Range Status   12/18/2017 303 (H) 70 - 99 mg/dL      BUN, Bld   Date Value Ref Range Status   12/18/2017 48 (H) 8 - 20 mg/dL      Creatinine   Date Value Ref Range Status   12/18/2017 0.97 0.60 - 1.40 mg/dL      Calcium   Date Value Ref Range Status   12/18/2017 9.8 7.7 - 10.4 mg/dL      Total Protein   Date Value Ref Range Status   12/18/2017 6.8 6.0 - 8.2 g/dL      Albumin   Date Value Ref Range Status   12/18/2017 3.8 3.1 - 4.7 g/dL      Total Bilirubin   Date Value Ref Range Status   12/18/2017 0.7 0.3 - 1.0 mg/dL      Alkaline Phosphatase   Date Value Ref Range Status   12/18/2017 80 40 - 104 IU/L      AST   Date Value Ref Range Status   12/18/2017 18 10 - 40 IU/L      ALT   Date Value Ref Range Status   09/25/2008 10 10 - 44 U/L Final     CRP   Date Value Ref Range Status   12/18/2017 1.40 0.00 - 1.40 mg/dL      Rheumatoid Factor   Date Value Ref Range Status   12/18/2017 <10.0 0.0 - 13.9 IU/mL      Comment:     Performed at: [facility], [facility] [address], [id number] Arce MD, Phone:  [phone]     Uric Acid   Date Value Ref Range Status   12/18/2017 5.7 2.6 - 7.8 mg/dL          Radiology/Diagnostic Studies:    None. A laboratory was provided from the rehabilitation facility. The CRP was approximately 19 while the sedimentation rate was normal.    Assessment/Discussion/Plan:   84 y.o. female with polymyalgia rheumatica, clinically, untreated due to other complications including infection, dehydration, and uncontrolled diabetes      PLAN:  Her diagnosis at this point is one  of clinical PMR. The daughter is pushing aggressively for a prescription for prednisone, citing the excellent response the patient had on 60 mg daily while in the rehabilitation facility. I have taken considerable time to explain to the daughter and to the patient --who is fully participating in this discussion and shows no problems with comprehension or expression-- that high-dose steroids are contraindicated by her other underlying problems, particularly the diabetes. Her blood sugar at our last visit here was 303, this without anysteroids on board.  I will start her then on prednisone 2.5 mg 3 times daily. Baseline blood testing will be repeated today. I will get another blood sugar in 3 weeks.    RTC:  I will see her back in 2 months.      Electronically signed by Rashad Hinson MD

## 2018-03-26 NOTE — PATIENT INSTRUCTIONS
Diabetes: Getting Started with Exercise    Getting started is easier than you think. Simple and small movements can get you started on a regular exercise routine. You dont need to join a gym to start moving. Choose an activity you enjoy. Start slowly and set small goals. Work activity into your daily life. Talk to your healthcare provider before starting an activity program. You may need to have a checkup before you begin.  Start with movement  If youre not used to being active, start with gentle movements while you watch TV. Raise your arms and legs while seated. Then repeat for 5 to 10 minutes. With time, add some slow walking. Even taking a flight of stairs instead of the elevator can lift you to healthier heights. These types of brief activities are great ways to get started. They can help lower your blood sugar level, strengthen your heart, and improve your energy.  Steps toward being more active  Your goal, especially at first, is to keep your activity simple. Slowly work up to 30 minutes of activity a day. But you dont need to do it all at once. You can be active in 3, 10-minute sessions a day. You can also combine being active with the other things you need to do. For instance, stand up from your desk and walk around often when at work. Or, go for a walk around the mall before you shop.  Keep your activity simple  Why make activity hard on yourself? Choose things that you like to do and that fit into your schedule. Here are some tips:  · Get off the bus a stop or two early and walk the rest of the way.  · Run small shopping errands on your bike.  · Go for a 10-minute walk after each meal.  · Park your car in the space farthest from where youre going.  · Get a pedometer that records the number of steps you take. Make a goal for the number of steps you take each day. Increase your goal a little each week.  Keep your activity safe  Safety tips include the following:   · Be sure to warm up before you start  and cool down when youre done.  · Carry or wear identification, such as a necklace or bracelet, that says that you have diabetes.  · Carry a cell phone with you in case you need to call for assistance.  · Stay well hydrated before, during, and after your exercise.    · Eat 1 to 2 hours before you exercise, if instructed.  · Check your blood sugar before and after you exercise, if instructed. Check your blood sugar if you feel symptoms.  · Carry fast-acting sugar with you in case you have low blood sugar.  · Wear socks and well-fitting shoes. Check your feet for blisters or redness after the exercise.   · Think about the weather in your area. At times, you may need to choose indoor rather than outdoor activities.  · Stop the activity if you feel any pain, shortness of breath, or light-headedness.  Make your activity fun  Mix fitness with fun. The more fun you have, the more likely you are to stick to your plan. You can have a better blood sugar level along with an active, fun day. Try these hints:  · Choose an exercise that you enjoy and can do easily.  · Join a social club that goes for walks or does other physical activities.  · Go bird watching or do something else that gets you outdoors.  · Put on some music and dance.  · Involve your family or friends in your physical activity.  Date Last Reviewed: 5/1/2016  © 2777-7604 [employer]. [address] [place] [address], [place] [address]. All rights reserved. This information is not intended as a substitute for professional medical care. Always follow your healthcare professional's instructions.

## 2018-03-26 NOTE — TELEPHONE ENCOUNTER
Returned call, the patient was to keep the catheter in until she sees the MD, they made a sooner appt for this week, 3/28/18, in which the MD will access the need for the catheter, she verbally understood.

## 2018-03-27 ENCOUNTER — TELEPHONE (OUTPATIENT)
Dept: RHEUMATOLOGY | Facility: CLINIC | Age: 83
End: 2018-03-27

## 2018-03-27 NOTE — PROGRESS NOTES
Noah, this patient identifies you as her primary provider as well as her cardiologist. Will you address her potassium please? Thanks

## 2018-03-27 NOTE — TELEPHONE ENCOUNTER
----- Message from Rashad Hinson MD sent at 3/27/2018  9:16 AM CDT -----  Let the patient know the potassium is low and they should contact their primary provider for supplementation. Thank you

## 2018-03-27 NOTE — TELEPHONE ENCOUNTER
Daughter notified potassium is low per Dr Hinson and to contact primary provider for supplementation.

## 2018-03-27 NOTE — PROGRESS NOTES
Let the patient know the potassium is low and they should contact their primary provider for supplementation. Thank you

## 2018-03-28 LAB
ALBUMIN SERPL-MCNC: 2.6 G/DL (ref 2.9–4.4)
ALBUMIN/GLOB SERPL ELPH: 1.1 {RATIO} (ref 0.7–1.7)
ALPHA 1 GLOBULIN/PROTEIN TOTAL: 0.3 G/DL (ref 0–0.4)
ALPHA 2 GLOBULIN/PROTEIN TOTAL: 0.7 G/DL (ref 0.4–1)
B-GLOBULIN FLD ELPH-MCNC: 1 G/DL (ref 0.7–1.3)
GAMMA GLOB FLD ELPH-MCNC: 0.4 G/DL (ref 0.4–1.8)
GLOBULIN SER CALC-MCNC: 2.4 G/DL (ref 2.2–3.9)
Lab: ABNORMAL
M PROTEIN MFR UR ELPH: ABNORMAL G/DL
PROT SERPL-MCNC: 5 G/DL (ref 6–8.5)

## 2018-04-02 NOTE — PROGRESS NOTES
"[facility] Note  Staff: RINA Layton    PCP: Noah Joseph    Chief Complaint: Hospital follow-up: Urinary retention    Subjective:        HPI: Vee Pizano is a 84 y.o. female presents to Urology clinic today for hospital f/up related to urinary retention issues.  Pt was admitted via ER with abdominal pain, found to be in urinary retention with a PVR of 900 cc with gross hematuria.  Also found to have UTI with bilateral hydronephrosis most likely secondary to obstructive uropathy.  This patient was on Eliquis, but was discontinued due to reaction of "bleeding complications".    Prior to [facility] admission, pt verbalizes in visit today that she went to [facility] before this last visit and was treated for what she thought was for a urinary tract infection which never resolved.  The patient has been on Oxybutynin XL 15 mg one tablet daily for a while which was prescribed by her Cardiologist Dr. Joseph for overactive bladder d/o.      The patient was seen by Dr. Peterson as a consult on 03/17/2018 at [facility] for urinary retention and bilateral hydronephrosis.  MD final diagnosis is suspicious for Neurogenic bladder disorder per notes reviewed.    The pt is currently residing at [facility] [place], [place] for physical therapy which daughter states today, pt's  is paying out of pocket for and pt will be discharged on 04/06/18 to home with  to care for her on his own.    REVIEW OF SYSTEMS:  Review of Systems   Constitutional: Negative for chills, diaphoresis, fever and weight loss.   HENT: Negative for congestion, hearing loss, nosebleeds and sore throat.    Eyes: Negative for blurred vision and pain.   Respiratory: Negative for cough and wheezing.    Cardiovascular: Negative for chest pain, palpitations and leg swelling.        Chronic atrial fibrillation  Hypertension   Gastrointestinal: Negative for abdominal pain, heartburn, nausea and vomiting.   Genitourinary: Negative for " dysuria, flank pain, frequency, hematuria and urgency.        Urinary retention-graves catheter in place and intact   Musculoskeletal: Positive for joint pain and myalgias. Negative for back pain and neck pain.        +RA, PMR-unable to even stand up without full assistance.  Pt gets around by wheelchair at this time.   Skin: Negative for itching and rash.   Neurological: Negative for dizziness, tremors, sensory change, seizures, loss of consciousness, weakness and headaches.   Endo/Heme/Allergies: Does not bruise/bleed easily.   Psychiatric/Behavioral: Negative for depression and suicidal ideas. The patient is not nervous/anxious.      PMHx:  Past Medical History:   Diagnosis Date    Chronic atrial fibrillation     Diabetes mellitus     Hyperlipidemia     Hypertension     PMR (polymyalgia rheumatica)     Rheumatoid arthritis     Thyroid disease      PSHx:  Past Surgical History:   Procedure Laterality Date    APPENDECTOMY      BREAST BIOPSY Right     CARDIAC PACEMAKER PLACEMENT      CATARACT EXTRACTION Right     CHOLECYSTECTOMY      HYSTERECTOMY      JOINT REPLACEMENT Left     knee    TONSILLECTOMY       Social History     Social History    Marital status:      Spouse name: N/A    Number of children: N/A    Years of education: N/A     Social History Main Topics    Smoking status: Former Smoker     Packs/day: 1.00     Years: 10.00     Types: Cigarettes     Quit date: 1967    Smokeless tobacco: Never Used    Alcohol use No    Drug use: No    Sexual activity: Not Asked     Other Topics Concern    None     Social History Narrative    None     Allergies:  Patient has no known allergies.    Medications: reviewed   Anticoagulation: Yes - Aspirin 81 mg one tablet daily.    Objective:     Vitals:    04/03/18 0915   BP: 124/63   Pulse: 77   Temp: 99.2 °F (37.3 °C)     Physical Exam   Vitals reviewed.  Constitutional: She is oriented to person, place, and time. She appears well-developed and  well-nourished.   HENT:   Head: Normocephalic and atraumatic.   Eyes: Conjunctivae and EOM are normal. Pupils are equal, round, and reactive to light.   Neck: Normal range of motion. Neck supple.   Cardiovascular: Normal rate, normal heart sounds and intact distal pulses.    Pulmonary/Chest: Effort normal and breath sounds normal.   Abdominal: Soft. Bowel sounds are normal.   Musculoskeletal:   +Limited ROM to bilateral LE at this time.   Neurological: She is alert and oriented to person, place, and time. She has normal reflexes.   Skin: Skin is warm and dry.     Psychiatric: She has a normal mood and affect. Her behavior is normal. Judgment and thought content normal.     LABS REVIEW:  Done on 04/02/18 through [facility]  Potassium: 3.3 L  BUN:13  Cr:  0.54  GFR:  >60    Radiology Reports:  Abdomen and Pelvis with contrast CT done [facility] on 03/16/2018:  IMPRESSION:  There is appearance of associated bilateral mild to moderate hydroureter and hydronephrosis with mild periureteral stranding, without significant perinephric inflammatory change, no ureteral calculi seen, clinical and historical correlation and followup is recommended to exclude the possibility of an infiltrating process of the urinary bladder.    Biliary dilatation may relate to passive biliary dilatation of prior cholecystectomy however is more prominent than typically seen  Mild to moderate prominence of the colon    Assessment:       1. Urinary retention    2. OAB (overactive bladder)    3. Urinary incontinence, unspecified type    4. PMR (polymyalgia rheumatica)          Plan:   Hospital F/up: Urinary retention vs. Underlying neurogenic bladder d/o:  (Please refer to Dr. Peterson's consult note from [facility])    --Leave graves catheter in place and intact at this time.    --Orders sent back with patient for urinary catheter to be changed before pt is discharged home this Friday by [facility].  --Oxybutynin to be discontinued.  --Pt is to  be given Pyridium prn bladder spasms only while catheter in place.    F/u with Urologist for cystoscopy and urodynamic evaluation for possible Neurogenic bladder disorder.    [facility]: Annmarie Baltazar, FNP-C

## 2018-04-03 ENCOUNTER — OFFICE VISIT (OUTPATIENT)
Dept: UROLOGY | Facility: CLINIC | Age: 83
End: 2018-04-03
Payer: MEDICARE

## 2018-04-03 VITALS
SYSTOLIC BLOOD PRESSURE: 124 MMHG | HEART RATE: 77 BPM | DIASTOLIC BLOOD PRESSURE: 63 MMHG | BODY MASS INDEX: 30.43 KG/M2 | WEIGHT: 155 LBS | TEMPERATURE: 99 F | HEIGHT: 60 IN

## 2018-04-03 DIAGNOSIS — R32 URINARY INCONTINENCE, UNSPECIFIED TYPE: ICD-10-CM

## 2018-04-03 DIAGNOSIS — N32.81 OAB (OVERACTIVE BLADDER): ICD-10-CM

## 2018-04-03 DIAGNOSIS — M35.3 PMR (POLYMYALGIA RHEUMATICA): ICD-10-CM

## 2018-04-03 DIAGNOSIS — R33.9 URINARY RETENTION: Primary | ICD-10-CM

## 2018-04-03 PROCEDURE — 99999 PR PBB SHADOW E&M-EST. PATIENT-LVL V: CPT | Mod: PBBFAC,,, | Performed by: NURSE PRACTITIONER

## 2018-04-03 PROCEDURE — 99215 OFFICE O/P EST HI 40 MIN: CPT | Mod: PBBFAC,PN | Performed by: NURSE PRACTITIONER

## 2018-04-03 PROCEDURE — 99214 OFFICE O/P EST MOD 30 MIN: CPT | Mod: S$PBB,,, | Performed by: NURSE PRACTITIONER

## 2018-04-03 RX ORDER — PHENAZOPYRIDINE HYDROCHLORIDE 200 MG/1
200 TABLET, FILM COATED ORAL 3 TIMES DAILY PRN
Qty: 30 TABLET | Refills: 1 | Status: SHIPPED | OUTPATIENT
Start: 2018-04-03 | End: 2018-04-13

## 2018-04-03 NOTE — LETTER
April 3, 2018      Noah Joseph MD  [address] [facility]  [place] [address]           [facility]  [address] [place] [address] Dr. Marquez  [facility] [address]  Phone: [phone]  Fax: [phone]          Patient: Vee Pizano   MR Number: [id number]   YOB: 1933   Date of Visit: 4/3/2018       Dear Dr. Noah Joseph:    Thank you for referring Vee Pizano to me for evaluation. Attached you will find relevant portions of my assessment and plan of care.    If you have questions, please do not hesitate to call me. I look forward to following Vee Pizano along with you.    Sincerely,    Holly Baltazar, [facility]    Enclosure  CC:  No Recipients    If you would like to receive this communication electronically, please contact [email or web address] or [phone] to request more information on [facility] access.    For providers and/or their staff who would like to refer a patient to [facility], please contact us through our one-stop-shop provider referral line, [facility], at [phone].    If you feel you have received this communication in error or would no longer like to receive these types of communications, please e-mail [email or web address]

## 2018-04-25 ENCOUNTER — TELEPHONE (OUTPATIENT)
Dept: UROLOGY | Facility: CLINIC | Age: 83
End: 2018-04-25

## 2018-04-25 NOTE — TELEPHONE ENCOUNTER
"**Please call this patient's caregiver and schedule pt to see one of Urologists for further evaluation of possible neurogenic bladder disorder.  Appt was never scheduled in reviewing her chart today, unsure whether pt/family cancelled appointment??    **Just now reviewed appointments-pt has cancelled twice with Dr. Cooper.    This patient was seen by Dr. Peterson as an initial consult at [facility] on  03/16/18 for urinary retention, hydronephrosis, UTI and was seen by me on 04/03/18 as a hospital follow-up.  In reviewing charts today, she was suppose to be scheduled with one of MDs for further testing per Dr. Peterson's consult note, but in reviewing charts, I do not see that the appointment was ever made.    During last ov with me (04/03/18) the patient was residing at [place], [place], but shortly after appointment pt and family had stated the pt was soon to be discharged from facility due to costs and insurance and was going to be taken care of at home by her .  As of last visit with me we decided to keep catheter in place and orders were sent with pt to change out catheter every 30 days due to renal function, imaging results, and pt total care at that time.    (Pt's [facility] paperwork along with [facility] consult, in my office in "PT RECORDS" folder)  "

## 2018-06-28 ENCOUNTER — TELEPHONE (OUTPATIENT)
Dept: RHEUMATOLOGY | Facility: CLINIC | Age: 83
End: 2018-06-28

## 2018-06-28 DIAGNOSIS — M35.3 PMR (POLYMYALGIA RHEUMATICA): ICD-10-CM

## 2018-06-28 RX ORDER — PREDNISONE 2.5 MG/1
2.5 TABLET ORAL 3 TIMES DAILY
Qty: 90 TABLET | Refills: 5 | Status: SHIPPED | OUTPATIENT
Start: 2018-06-28 | End: 2019-01-15 | Stop reason: SDUPTHER

## 2018-06-28 NOTE — TELEPHONE ENCOUNTER
Requests prednisone refill sent to [facility] pharmacy.  Patient had CMP on 6/8/18 glucose of 120.   This is noted in Care everywhere

## 2018-10-05 ENCOUNTER — TELEPHONE (OUTPATIENT)
Dept: UROLOGY | Facility: CLINIC | Age: 83
End: 2018-10-05

## 2018-10-05 DIAGNOSIS — N39.0 URINARY TRACT INFECTION WITHOUT HEMATURIA, SITE UNSPECIFIED: Primary | ICD-10-CM

## 2018-10-05 RX ORDER — CEPHALEXIN 500 MG/1
500 CAPSULE ORAL EVERY 12 HOURS
Qty: 20 CAPSULE | Refills: 0 | Status: SHIPPED | OUTPATIENT
Start: 2018-10-05 | End: 2018-10-15

## 2018-10-05 RX ORDER — FLUCONAZOLE 150 MG/1
150 TABLET ORAL DAILY
Qty: 1 TABLET | Refills: 0 | Status: SHIPPED | OUTPATIENT
Start: 2018-10-05 | End: 2018-10-06

## 2018-10-05 NOTE — TELEPHONE ENCOUNTER
Please call pt's family and advise of the following:    Please let pt know their urine culture showed +UTI therefore we are sending in Keflex and Diflucan to her pharmacy to begin taking today.    Home Health Culture Results collected on 10/01/18 showed the following:    +E.Coli and +Klebsiella pneum. Both >[id number]

## 2018-10-12 ENCOUNTER — TELEPHONE (OUTPATIENT)
Dept: UROLOGY | Facility: CLINIC | Age: 83
End: 2018-10-12

## 2018-10-12 NOTE — TELEPHONE ENCOUNTER
----- Message from Iris Henderson sent at 10/12/2018  4:04 PM CDT -----  Contact: Tamera Wells  Type:  Patient Returning Call    Who Called:  Tamera Wells  Who Left Message for Patient:  Luana  Does the patient know what this is regarding?:  chuy  Best Call Back Number:    Additional Information: I sent an IM to Luana. No reply.

## 2018-10-12 NOTE — TELEPHONE ENCOUNTER
----- Message from Christina Gonzalez sent at 10/12/2018  3:44 PM CDT -----  Contact: daughter, Tamera Wells  Daughter Tamera Wells would like to know who called the office to order the antibiotic for patient's UTI. Please call patient's daughter at [phone]. Thanks!

## 2018-10-12 NOTE — TELEPHONE ENCOUNTER
Called and spoke to patient and informed that we did call in the antibiotic from the results that we received on 10/1/18, states understanding and call was ended.

## 2018-10-15 DIAGNOSIS — M35.3 PMR (POLYMYALGIA RHEUMATICA): ICD-10-CM

## 2018-10-15 RX ORDER — GABAPENTIN 100 MG/1
100 CAPSULE ORAL 3 TIMES DAILY
Qty: 90 CAPSULE | Refills: 3 | OUTPATIENT
Start: 2018-10-15

## 2019-01-15 DIAGNOSIS — M35.3 PMR (POLYMYALGIA RHEUMATICA): ICD-10-CM

## 2019-01-15 RX ORDER — PREDNISONE 2.5 MG/1
2.5 TABLET ORAL 3 TIMES DAILY
Qty: 90 TABLET | Refills: 1 | Status: SHIPPED | OUTPATIENT
Start: 2019-01-15

## 2019-03-25 DIAGNOSIS — M35.3 PMR (POLYMYALGIA RHEUMATICA): ICD-10-CM

## 2019-03-25 RX ORDER — PREDNISONE 2.5 MG/1
2.5 TABLET ORAL 3 TIMES DAILY
Qty: 90 TABLET | Refills: 1 | OUTPATIENT
Start: 2019-03-25

## 2019-05-17 ENCOUNTER — TELEPHONE (OUTPATIENT)
Dept: ADMINISTRATIVE | Facility: CLINIC | Age: 84
End: 2019-05-17

## 2019-05-17 NOTE — TELEPHONE ENCOUNTER
Home Health SOC 04/16/2019 - 06/14/2019 ([facility]) - Dr. Jeremiah Wells. Patient received  services.

## 2019-08-16 ENCOUNTER — HOSPITAL ENCOUNTER (INPATIENT)
Facility: HOSPITAL | Age: 84
LOS: 6 days | Discharge: HOME HEALTH | DRG: 698 | End: 2019-08-22
Attending: EMERGENCY MEDICINE | Admitting: INTERNAL MEDICINE
Payer: MEDICARE

## 2019-08-16 DIAGNOSIS — I48.0 PAROXYSMAL ATRIAL FIBRILLATION WITH RAPID VENTRICULAR RESPONSE: Primary | ICD-10-CM

## 2019-08-16 DIAGNOSIS — A41.9 SEPSIS: ICD-10-CM

## 2019-08-16 DIAGNOSIS — R07.9 CHEST PAIN: ICD-10-CM

## 2019-08-16 DIAGNOSIS — N39.0 URINARY TRACT INFECTION ASSOCIATED WITH INDWELLING URETHRAL CATHETER, INITIAL ENCOUNTER: ICD-10-CM

## 2019-08-16 DIAGNOSIS — R53.1 GENERALIZED WEAKNESS: ICD-10-CM

## 2019-08-16 DIAGNOSIS — T83.511A URINARY TRACT INFECTION ASSOCIATED WITH INDWELLING URETHRAL CATHETER, INITIAL ENCOUNTER: ICD-10-CM

## 2019-08-16 DIAGNOSIS — N17.9 AKI (ACUTE KIDNEY INJURY): ICD-10-CM

## 2019-08-16 PROBLEM — R79.89 ELEVATED TROPONIN: Status: ACTIVE | Noted: 2019-08-16

## 2019-08-16 PROBLEM — I95.9 HYPOTENSION: Status: ACTIVE | Noted: 2019-08-16

## 2019-08-16 PROBLEM — B99.9 INFECTIOUS ENCEPHALOPATHY: Status: ACTIVE | Noted: 2019-08-16

## 2019-08-16 PROBLEM — Z71.89 ADVANCED CARE PLANNING/COUNSELING DISCUSSION: Status: ACTIVE | Noted: 2019-08-16

## 2019-08-16 PROBLEM — G93.49 INFECTIOUS ENCEPHALOPATHY: Status: ACTIVE | Noted: 2019-08-16

## 2019-08-16 PROBLEM — M35.3 PMR (POLYMYALGIA RHEUMATICA): Chronic | Status: ACTIVE | Noted: 2019-08-16

## 2019-08-16 PROBLEM — I50.32 CHRONIC DIASTOLIC HEART FAILURE: Chronic | Status: ACTIVE | Noted: 2019-08-16

## 2019-08-16 PROBLEM — I48.91 ATRIAL FIBRILLATION WITH RVR: Status: ACTIVE | Noted: 2019-08-16

## 2019-08-16 PROBLEM — D64.9 ANEMIA: Chronic | Status: ACTIVE | Noted: 2019-08-16

## 2019-08-16 PROBLEM — R00.0 WIDE-COMPLEX TACHYCARDIA: Chronic | Status: ACTIVE | Noted: 2019-08-16

## 2019-08-16 PROBLEM — I87.8 CHRONIC VENOUS STASIS: Status: ACTIVE | Noted: 2019-08-16

## 2019-08-16 PROBLEM — S31.000A SACRAL WOUND: Chronic | Status: ACTIVE | Noted: 2019-08-16

## 2019-08-16 LAB
ALBUMIN SERPL BCP-MCNC: 2.5 G/DL (ref 3.5–5.2)
ALLENS TEST: ABNORMAL
ALP SERPL-CCNC: 66 U/L (ref 55–135)
ALT SERPL W/O P-5'-P-CCNC: 24 U/L (ref 10–44)
ANION GAP SERPL CALC-SCNC: 12 MMOL/L (ref 8–16)
APTT PPP: 33.8 SEC (ref 26.2–34.7)
AST SERPL-CCNC: 37 U/L (ref 10–40)
BACTERIA #/AREA URNS HPF: ABNORMAL /HPF
BASOPHILS # BLD AUTO: 0.01 K/UL (ref 0–0.2)
BASOPHILS NFR BLD: 0.1 % (ref 0–1.9)
BILIRUB SERPL-MCNC: 1 MG/DL (ref 0.1–1)
BILIRUB UR QL STRIP: NEGATIVE
BNP SERPL-MCNC: 198 PG/ML (ref 0–99)
BUN SERPL-MCNC: 53 MG/DL (ref 8–23)
CALCIUM SERPL-MCNC: 8.6 MG/DL (ref 8.7–10.5)
CHLORIDE SERPL-SCNC: 96 MMOL/L (ref 95–110)
CLARITY UR: ABNORMAL
CO2 SERPL-SCNC: 29 MMOL/L (ref 23–29)
COLOR UR: ABNORMAL
CREAT SERPL-MCNC: 1 MG/DL (ref 0.5–1.4)
DELSYS: ABNORMAL
DIFFERENTIAL METHOD: ABNORMAL
EOSINOPHIL # BLD AUTO: 0.2 K/UL (ref 0–0.5)
EOSINOPHIL NFR BLD: 2.4 % (ref 0–8)
ERYTHROCYTE [DISTWIDTH] IN BLOOD BY AUTOMATED COUNT: 17.6 % (ref 11.5–14.5)
EST. GFR  (AFRICAN AMERICAN): 58.9 ML/MIN/1.73 M^2
EST. GFR  (NON AFRICAN AMERICAN): 51.1 ML/MIN/1.73 M^2
FIO2: 21
GLUCOSE SERPL-MCNC: 186 MG/DL (ref 70–110)
GLUCOSE SERPL-MCNC: 281 MG/DL (ref 70–110)
GLUCOSE UR QL STRIP: NEGATIVE
HCO3 UR-SCNC: 25 MMOL/L (ref 24–28)
HCT VFR BLD AUTO: 32 % (ref 37–48.5)
HGB BLD-MCNC: 9.8 G/DL (ref 12–16)
HGB UR QL STRIP: ABNORMAL
HYALINE CASTS #/AREA URNS LPF: 126 /LPF
IMM GRANULOCYTES # BLD AUTO: 0.12 K/UL (ref 0–0.04)
IMM GRANULOCYTES NFR BLD AUTO: 1.7 % (ref 0–0.5)
INR PPP: 1.1
KETONES UR QL STRIP: NEGATIVE
LACTATE SERPL-SCNC: 1.6 MMOL/L (ref 0.5–1.9)
LDH SERPL L TO P-CCNC: 1.22 MMOL/L (ref 0.5–2.2)
LEUKOCYTE ESTERASE UR QL STRIP: ABNORMAL
LYMPHOCYTES # BLD AUTO: 1 K/UL (ref 1–4.8)
LYMPHOCYTES NFR BLD: 14.4 % (ref 18–48)
MAGNESIUM SERPL-MCNC: 1.6 MG/DL (ref 1.6–2.6)
MCH RBC QN AUTO: 29.8 PG (ref 27–31)
MCHC RBC AUTO-ENTMCNC: 30.6 G/DL (ref 32–36)
MCV RBC AUTO: 97 FL (ref 82–98)
MICROSCOPIC COMMENT: ABNORMAL
MODE: ABNORMAL
MONOCYTES # BLD AUTO: 0.3 K/UL (ref 0.3–1)
MONOCYTES NFR BLD: 4.7 % (ref 4–15)
NEUTROPHILS # BLD AUTO: 5.4 K/UL (ref 1.8–7.7)
NEUTROPHILS NFR BLD: 76.7 % (ref 38–73)
NITRITE UR QL STRIP: NEGATIVE
NRBC BLD-RTO: 0 /100 WBC
PCO2 BLDA: 34.4 MMHG (ref 35–45)
PH SMN: 7.47 [PH] (ref 7.35–7.45)
PH UR STRIP: 6 [PH] (ref 5–8)
PLATELET # BLD AUTO: 138 K/UL (ref 150–350)
PMV BLD AUTO: 10.6 FL (ref 9.2–12.9)
PO2 BLDA: 61 MMHG (ref 80–100)
POC BE: 1 MMOL/L
POC SATURATED O2: 93 % (ref 95–100)
POC TCO2: 26 MMOL/L (ref 23–27)
POTASSIUM SERPL-SCNC: 3.5 MMOL/L (ref 3.5–5.1)
PROT SERPL-MCNC: 6.1 G/DL (ref 6–8.4)
PROT UR QL STRIP: ABNORMAL
PROTHROMBIN TIME: 13.3 SEC (ref 11.7–14)
RBC # BLD AUTO: 3.29 M/UL (ref 4–5.4)
RBC #/AREA URNS HPF: 22 /HPF (ref 0–4)
SAMPLE: ABNORMAL
SAMPLE: NORMAL
SITE: ABNORMAL
SODIUM SERPL-SCNC: 137 MMOL/L (ref 136–145)
SP GR UR STRIP: 1.01 (ref 1–1.03)
SQUAMOUS #/AREA URNS HPF: 38 /HPF
TROPONIN I SERPL DL<=0.01 NG/ML-MCNC: 0.06 NG/ML (ref 0.02–0.04)
TROPONIN I SERPL DL<=0.01 NG/ML-MCNC: 0.1 NG/ML (ref 0.02–0.04)
TSH SERPL DL<=0.005 MIU/L-ACNC: 1.86 UIU/ML (ref 0.34–5.6)
URN SPEC COLLECT METH UR: ABNORMAL
UROBILINOGEN UR STRIP-ACNC: NEGATIVE EU/DL
WBC # BLD AUTO: 7.08 K/UL (ref 3.9–12.7)
WBC #/AREA URNS HPF: >100 /HPF (ref 0–5)

## 2019-08-16 PROCEDURE — 80053 COMPREHEN METABOLIC PANEL: CPT

## 2019-08-16 PROCEDURE — 85610 PROTHROMBIN TIME: CPT

## 2019-08-16 PROCEDURE — 83036 HEMOGLOBIN GLYCOSYLATED A1C: CPT

## 2019-08-16 PROCEDURE — 63600175 PHARM REV CODE 636 W HCPCS: Performed by: INTERNAL MEDICINE

## 2019-08-16 PROCEDURE — 21400001 HC TELEMETRY ROOM

## 2019-08-16 PROCEDURE — 87186 SC STD MICRODIL/AGAR DIL: CPT

## 2019-08-16 PROCEDURE — 87040 BLOOD CULTURE FOR BACTERIA: CPT

## 2019-08-16 PROCEDURE — 87086 URINE CULTURE/COLONY COUNT: CPT

## 2019-08-16 PROCEDURE — 85025 COMPLETE CBC W/AUTO DIFF WBC: CPT

## 2019-08-16 PROCEDURE — 36415 COLL VENOUS BLD VENIPUNCTURE: CPT

## 2019-08-16 PROCEDURE — 82803 BLOOD GASES ANY COMBINATION: CPT

## 2019-08-16 PROCEDURE — 63600175 PHARM REV CODE 636 W HCPCS: Performed by: EMERGENCY MEDICINE

## 2019-08-16 PROCEDURE — 99900035 HC TECH TIME PER 15 MIN (STAT)

## 2019-08-16 PROCEDURE — 83735 ASSAY OF MAGNESIUM: CPT

## 2019-08-16 PROCEDURE — 84484 ASSAY OF TROPONIN QUANT: CPT | Mod: 91

## 2019-08-16 PROCEDURE — 84443 ASSAY THYROID STIM HORMONE: CPT

## 2019-08-16 PROCEDURE — 93005 ELECTROCARDIOGRAM TRACING: CPT

## 2019-08-16 PROCEDURE — 83880 ASSAY OF NATRIURETIC PEPTIDE: CPT

## 2019-08-16 PROCEDURE — 84484 ASSAY OF TROPONIN QUANT: CPT

## 2019-08-16 PROCEDURE — 85730 THROMBOPLASTIN TIME PARTIAL: CPT

## 2019-08-16 PROCEDURE — 81001 URINALYSIS AUTO W/SCOPE: CPT

## 2019-08-16 PROCEDURE — 87077 CULTURE AEROBIC IDENTIFY: CPT

## 2019-08-16 PROCEDURE — 36600 WITHDRAWAL OF ARTERIAL BLOOD: CPT

## 2019-08-16 PROCEDURE — 83605 ASSAY OF LACTIC ACID: CPT

## 2019-08-16 PROCEDURE — 21000000 HC CCU ICU ROOM CHARGE

## 2019-08-16 PROCEDURE — 25000003 PHARM REV CODE 250: Performed by: INTERNAL MEDICINE

## 2019-08-16 PROCEDURE — 96376 TX/PRO/DX INJ SAME DRUG ADON: CPT

## 2019-08-16 PROCEDURE — 96365 THER/PROPH/DIAG IV INF INIT: CPT | Mod: 59

## 2019-08-16 PROCEDURE — 99291 CRITICAL CARE FIRST HOUR: CPT

## 2019-08-16 RX ORDER — GABAPENTIN 100 MG/1
100 CAPSULE ORAL 3 TIMES DAILY
Status: DISCONTINUED | OUTPATIENT
Start: 2019-08-16 | End: 2019-08-23 | Stop reason: HOSPADM

## 2019-08-16 RX ORDER — PANTOPRAZOLE SODIUM 40 MG/1
40 TABLET, DELAYED RELEASE ORAL DAILY
Status: DISCONTINUED | OUTPATIENT
Start: 2019-08-17 | End: 2019-08-23 | Stop reason: HOSPADM

## 2019-08-16 RX ORDER — SODIUM,POTASSIUM PHOSPHATES 280-250MG
2 POWDER IN PACKET (EA) ORAL
Status: DISCONTINUED | OUTPATIENT
Start: 2019-08-16 | End: 2019-08-23 | Stop reason: HOSPADM

## 2019-08-16 RX ORDER — BISACODYL 10 MG
10 SUPPOSITORY, RECTAL RECTAL DAILY PRN
Status: DISCONTINUED | OUTPATIENT
Start: 2019-08-16 | End: 2019-08-23 | Stop reason: HOSPADM

## 2019-08-16 RX ORDER — POTASSIUM CHLORIDE 20 MEQ/15ML
40 SOLUTION ORAL
Status: DISCONTINUED | OUTPATIENT
Start: 2019-08-16 | End: 2019-08-23 | Stop reason: HOSPADM

## 2019-08-16 RX ORDER — SOTALOL HYDROCHLORIDE 80 MG/1
80 TABLET ORAL 2 TIMES DAILY
Status: DISCONTINUED | OUTPATIENT
Start: 2019-08-16 | End: 2019-08-17

## 2019-08-16 RX ORDER — SODIUM CHLORIDE, SODIUM LACTATE, POTASSIUM CHLORIDE, CALCIUM CHLORIDE 600; 310; 30; 20 MG/100ML; MG/100ML; MG/100ML; MG/100ML
1000 INJECTION, SOLUTION INTRAVENOUS
Status: COMPLETED | OUTPATIENT
Start: 2019-08-16 | End: 2019-08-16

## 2019-08-16 RX ORDER — POLYETHYLENE GLYCOL 3350 17 G/17G
17 POWDER, FOR SOLUTION ORAL DAILY
Status: DISCONTINUED | OUTPATIENT
Start: 2019-08-17 | End: 2019-08-19

## 2019-08-16 RX ORDER — POTASSIUM CHLORIDE 20 MEQ/1
40 TABLET, EXTENDED RELEASE ORAL 2 TIMES DAILY
COMMUNITY

## 2019-08-16 RX ORDER — IBUPROFEN 200 MG
24 TABLET ORAL
Status: DISCONTINUED | OUTPATIENT
Start: 2019-08-16 | End: 2019-08-23 | Stop reason: HOSPADM

## 2019-08-16 RX ORDER — PREDNISONE 2.5 MG/1
2.5 TABLET ORAL 3 TIMES DAILY
Status: DISCONTINUED | OUTPATIENT
Start: 2019-08-16 | End: 2019-08-17

## 2019-08-16 RX ORDER — METOPROLOL SUCCINATE 50 MG/1
50 TABLET, EXTENDED RELEASE ORAL NIGHTLY
Status: DISCONTINUED | OUTPATIENT
Start: 2019-08-16 | End: 2019-08-17

## 2019-08-16 RX ORDER — SODIUM CHLORIDE 0.9 % (FLUSH) 0.9 %
10 SYRINGE (ML) INJECTION
Status: DISCONTINUED | OUTPATIENT
Start: 2019-08-16 | End: 2019-08-23 | Stop reason: HOSPADM

## 2019-08-16 RX ORDER — CLOTRIMAZOLE AND BETAMETHASONE DIPROPIONATE 10; .64 MG/G; MG/G
1 CREAM TOPICAL 2 TIMES DAILY
COMMUNITY

## 2019-08-16 RX ORDER — TRAMADOL HYDROCHLORIDE 50 MG/1
50 TABLET ORAL EVERY 4 HOURS PRN
Status: DISCONTINUED | OUTPATIENT
Start: 2019-08-16 | End: 2019-08-23 | Stop reason: HOSPADM

## 2019-08-16 RX ORDER — NAPROXEN SODIUM 220 MG/1
81 TABLET, FILM COATED ORAL EVERY MORNING
Status: DISCONTINUED | OUTPATIENT
Start: 2019-08-17 | End: 2019-08-23 | Stop reason: HOSPADM

## 2019-08-16 RX ORDER — GLUCAGON 1 MG
1 KIT INJECTION
Status: DISCONTINUED | OUTPATIENT
Start: 2019-08-16 | End: 2019-08-23 | Stop reason: HOSPADM

## 2019-08-16 RX ORDER — METOPROLOL SUCCINATE 50 MG/1
50 TABLET, EXTENDED RELEASE ORAL NIGHTLY
COMMUNITY

## 2019-08-16 RX ORDER — SERTRALINE HYDROCHLORIDE 50 MG/1
50 TABLET, FILM COATED ORAL EVERY MORNING
COMMUNITY

## 2019-08-16 RX ORDER — SERTRALINE HYDROCHLORIDE 50 MG/1
50 TABLET, FILM COATED ORAL EVERY MORNING
Status: DISCONTINUED | OUTPATIENT
Start: 2019-08-17 | End: 2019-08-23 | Stop reason: HOSPADM

## 2019-08-16 RX ORDER — CLOTRIMAZOLE AND BETAMETHASONE DIPROPIONATE 10; .64 MG/G; MG/G
1 CREAM TOPICAL 2 TIMES DAILY
Status: DISCONTINUED | OUTPATIENT
Start: 2019-08-16 | End: 2019-08-23 | Stop reason: HOSPADM

## 2019-08-16 RX ORDER — INSULIN ASPART 100 [IU]/ML
1-10 INJECTION, SOLUTION INTRAVENOUS; SUBCUTANEOUS
Status: DISCONTINUED | OUTPATIENT
Start: 2019-08-16 | End: 2019-08-23 | Stop reason: HOSPADM

## 2019-08-16 RX ORDER — MAGNESIUM SULFATE HEPTAHYDRATE 40 MG/ML
2 INJECTION, SOLUTION INTRAVENOUS ONCE
Status: COMPLETED | OUTPATIENT
Start: 2019-08-16 | End: 2019-08-16

## 2019-08-16 RX ORDER — AMIODARONE HYDROCHLORIDE 150 MG/3ML
150 INJECTION, SOLUTION INTRAVENOUS
Status: DISCONTINUED | OUTPATIENT
Start: 2019-08-16 | End: 2019-08-16

## 2019-08-16 RX ORDER — LANOLIN ALCOHOL/MO/W.PET/CERES
800 CREAM (GRAM) TOPICAL
Status: DISCONTINUED | OUTPATIENT
Start: 2019-08-16 | End: 2019-08-23 | Stop reason: HOSPADM

## 2019-08-16 RX ORDER — ONDANSETRON 2 MG/ML
4 INJECTION INTRAMUSCULAR; INTRAVENOUS EVERY 8 HOURS PRN
Status: DISCONTINUED | OUTPATIENT
Start: 2019-08-16 | End: 2019-08-23 | Stop reason: HOSPADM

## 2019-08-16 RX ORDER — IBUPROFEN 200 MG
16 TABLET ORAL
Status: DISCONTINUED | OUTPATIENT
Start: 2019-08-16 | End: 2019-08-23 | Stop reason: HOSPADM

## 2019-08-16 RX ORDER — TORSEMIDE 20 MG/1
20 TABLET ORAL 2 TIMES DAILY
Status: ON HOLD | COMMUNITY
End: 2019-08-22 | Stop reason: HOSPADM

## 2019-08-16 RX ORDER — ACETAMINOPHEN 325 MG/1
650 TABLET ORAL EVERY 4 HOURS PRN
Status: DISCONTINUED | OUTPATIENT
Start: 2019-08-16 | End: 2019-08-23 | Stop reason: HOSPADM

## 2019-08-16 RX ORDER — ALLOPURINOL 100 MG/1
100 TABLET ORAL 2 TIMES DAILY
Status: DISCONTINUED | OUTPATIENT
Start: 2019-08-16 | End: 2019-08-17

## 2019-08-16 RX ADMIN — ALLOPURINOL 100 MG: 100 TABLET ORAL at 10:08

## 2019-08-16 RX ADMIN — CLOTRIMAZOLE AND BETAMETHASONE DIPROPIONATE 1 G: 10; .5 CREAM TOPICAL at 11:08

## 2019-08-16 RX ADMIN — AMIODARONE HYDROCHLORIDE 1 MG/MIN: 1.8 INJECTION, SOLUTION INTRAVENOUS at 05:08

## 2019-08-16 RX ADMIN — SOTALOL HYDROCHLORIDE 80 MG: 80 TABLET ORAL at 10:08

## 2019-08-16 RX ADMIN — AMIODARONE HYDROCHLORIDE 150 MG: 1.5 INJECTION, SOLUTION INTRAVENOUS at 05:08

## 2019-08-16 RX ADMIN — METOPROLOL SUCCINATE 50 MG: 50 TABLET, FILM COATED, EXTENDED RELEASE ORAL at 10:08

## 2019-08-16 RX ADMIN — INSULIN ASPART 3 UNITS: 100 INJECTION, SOLUTION INTRAVENOUS; SUBCUTANEOUS at 11:08

## 2019-08-16 RX ADMIN — APIXABAN 5 MG: 5 TABLET, FILM COATED ORAL at 10:08

## 2019-08-16 RX ADMIN — GABAPENTIN 100 MG: 100 CAPSULE ORAL at 10:08

## 2019-08-16 RX ADMIN — MEROPENEM 500 MG: 500 INJECTION, POWDER, FOR SOLUTION INTRAVENOUS at 05:08

## 2019-08-16 RX ADMIN — SODIUM CHLORIDE, SODIUM LACTATE, POTASSIUM CHLORIDE, AND CALCIUM CHLORIDE 1000 ML: .6; .31; .03; .02 INJECTION, SOLUTION INTRAVENOUS at 05:08

## 2019-08-16 RX ADMIN — MAGNESIUM SULFATE 2 G: 2 INJECTION INTRAVENOUS at 06:08

## 2019-08-16 NOTE — ASSESSMENT & PLAN NOTE
Discussions with patient and 2 daughters present at bedside about code status.  Patient expressed wishes to be do not resuscitate, would want aggressive medical care but no chest compressions, intubation, life support.  Daughters in agreement stating aware low chances of successful resuscitation as well as current poor quality of life.

## 2019-08-16 NOTE — SUBJECTIVE & OBJECTIVE
Past Medical History:   Diagnosis Date    Chronic atrial fibrillation     Diabetes mellitus     Hyperlipidemia     Hypertension     PMR (polymyalgia rheumatica)     Rheumatoid arthritis     Thyroid disease        Past Surgical History:   Procedure Laterality Date    APPENDECTOMY      BREAST BIOPSY Right     CARDIAC PACEMAKER PLACEMENT      CATARACT EXTRACTION Right     CHOLECYSTECTOMY      HYSTERECTOMY      JOINT REPLACEMENT Left     knee    TONSILLECTOMY         Review of patient's allergies indicates:  No Known Allergies    No current facility-administered medications on file prior to encounter.      Current Outpatient Medications on File Prior to Encounter   Medication Sig    allopurinol (ZYLOPRIM) 100 MG tablet Take 100 mg by mouth 2 (two) times daily.     apixaban (ELIQUIS) 5 mg Tab Take 5 mg by mouth 2 (two) times daily.    aspirin 81 MG Chew Take 81 mg by mouth every morning.     clotrimazole-betamethasone 1-0.05% (LOTRISONE) cream Apply 1 g topically 2 (two) times daily.    esomeprazole (NEXIUM) 40 MG capsule Take 40 mg by mouth every morning.     gabapentin (NEURONTIN) 100 MG capsule Take 1 capsule (100 mg total) by mouth 3 (three) times daily.    levothyroxine (SYNTHROID) 150 MCG tablet Take 150 mcg by mouth every morning.     losartan (COZAAR) 50 MG tablet Take 50 mg by mouth every morning.     metoprolol succinate (TOPROL-XL) 50 MG 24 hr tablet Take 50 mg by mouth every evening.    potassium chloride SA (K-DUR,KLOR-CON) 20 MEQ tablet Take 40 mEq by mouth 2 (two) times daily.    predniSONE (DELTASONE) 2.5 MG tablet Take 1 tablet (2.5 mg total) by mouth 3 (three) times daily. NEEDS APPT (Patient taking differently: Take 2.5 mg by mouth 3 (three) times daily. )    sertraline (ZOLOFT) 50 MG tablet Take 50 mg by mouth every morning.    sotalol (BETAPACE) 80 MG tablet Take 80 mg by mouth 2 (two) times daily.     torsemide (DEMADEX) 20 MG Tab Take 20 mg by mouth 2 (two) times daily.     VITAMIN D2 50,000 unit capsule Take 50,000 Units by mouth every 7 days. ON     LANTUS U-100 INSULIN 100 unit/mL injection Inject 10 Units into the skin daily as needed.     NOVOLOG FLEXPEN U-100 INSULIN 100 unit/mL InPn pen Inject 5 Units into the skin 3 (three) times daily as needed (per sliding scale, hold for BGL<150).     traMADol (ULTRAM) 50 mg tablet Take 50 mg by mouth every 4 (four) hours as needed.     [DISCONTINUED] amLODIPine (NORVASC) 5 MG tablet Take by mouth.    [DISCONTINUED] CYANOCOBALAMIN, VITAMIN B-12, (VITAMIN B-12 ORAL) Take by mouth.    [DISCONTINUED] hydroCHLOROthiazide (HYDRODIURIL) 25 MG tablet Take by mouth.    [DISCONTINUED] lisinopril 10 MG tablet     [DISCONTINUED] simvastatin (ZOCOR) 20 MG tablet Take by mouth.    [DISCONTINUED] TEKTURNA 300 mg Tab     [DISCONTINUED] TRUE METRIX GLUCOSE TEST STRIP Strp     [DISCONTINUED] TRUEPLUS LANCETS 28 gauge Misc      Family History     Problem Relation (Age of Onset)    Cancer Father    Heart disease Mother        Tobacco Use    Smoking status: Former Smoker     Packs/day: 1.00     Years: 10.00     Pack years: 10.00     Types: Cigarettes     Last attempt to quit: 1967     Years since quittin.6    Smokeless tobacco: Never Used   Substance and Sexual Activity    Alcohol use: No    Drug use: No    Sexual activity: Not on file     Review of Systems   Constitutional: Positive for appetite change and fatigue. Negative for chills, diaphoresis and fever.   HENT: Negative for congestion, sinus pressure, sinus pain and sore throat.    Eyes: Negative for photophobia, discharge, redness and itching.   Respiratory: Positive for shortness of breath. Negative for cough and wheezing.    Cardiovascular: Positive for palpitations and leg swelling. Negative for chest pain.   Gastrointestinal: Negative for abdominal pain, constipation, nausea and vomiting.   Endocrine: Negative for polydipsia and polyuria.   Genitourinary:         Chronic indwelling Graves with foul-smelling urine   Musculoskeletal: Positive for back pain and gait problem.   Skin: Positive for rash and wound.   Neurological: Positive for weakness (Generalized). Negative for facial asymmetry, speech difficulty, numbness and headaches.   Hematological: Bruises/bleeds easily.   Psychiatric/Behavioral: Positive for confusion and hallucinations. Negative for agitation and suicidal ideas.     Objective:     Vital Signs (Most Recent):  Temp: 99.7 °F (37.6 °C) (08/16/19 1625)  Pulse: (!) 139 (08/16/19 1736)  Resp: (!) 24 (08/16/19 1736)  BP: (!) 106/59 (08/16/19 1736)  SpO2: 97 % (08/16/19 1736) Vital Signs (24h Range):  Temp:  [99.7 °F (37.6 °C)] 99.7 °F (37.6 °C)  Pulse:  [129-160] 139  Resp:  [23-26] 24  SpO2:  [91 %-97 %] 97 %  BP: ()/(47-59) 106/59        Body mass index is 29.29 kg/m².    Physical Exam   Constitutional: She is oriented to person, place, and time. No distress.   chronically ill-appearing obese female, appears fatigued, calm, cooperative   HENT:   Head: Normocephalic and atraumatic.   Mouth/Throat: No oropharyngeal exudate.   Eyes: Pupils are equal, round, and reactive to light. Conjunctivae and EOM are normal. Right eye exhibits no discharge. Left eye exhibits no discharge.   Neck: Normal range of motion. Neck supple.   Cardiovascular:   Irregular, tachycardic, + peripheral edema, reduced but palpable peripheral pulses   Pulmonary/Chest: No respiratory distress. She has no wheezes.   On supplemental O2 via NC, decreased air entry at bases   Abdominal:   Protuberant abdomen with pannus, soft and non tender   Genitourinary:   Genitourinary Comments: Graves with graves smell (present during changing)   Musculoskeletal:   Diffuse OA changes   Neurological: She is alert and oriented to person, place, and time.   Able to lift legs briefly against gravity   Skin: She is not diaphoretic.   Chronic bilateral LE venous stasis changes with trophic nail changes, wound on  the lateral aspect of R leg, sacral wound   Psychiatric:   No hallucinations   Nursing note and vitals reviewed.        CRANIAL NERVES     CN I  cranial nerve I not tested    CN III, IV, VI   Pupils are equal, round, and reactive to light.  Extraocular motions are normal.     CN V   Facial sensation intact.     CN VII   Facial expression full, symmetric.        Significant Labs:   Blood Culture: No results for input(s): LABBLOO in the last 48 hours.  BMP:   Recent Labs   Lab 08/16/19  1653   *      K 3.5   CL 96   CO2 29   BUN 53*   CREATININE 1.0   CALCIUM 8.6*   MG 1.6     CBC:   Recent Labs   Lab 08/16/19  1653   WBC 7.08   HGB 9.8*   HCT 32.0*   *     Magnesium:   Recent Labs   Lab 08/16/19  1653   MG 1.6     Troponin:   Recent Labs   Lab 08/16/19  1653   TROPONINI 0.057*     Urine Studies: No results for input(s): COLORU, APPEARANCEUA, PHUR, SPECGRAV, PROTEINUA, GLUCUA, KETONESU, BILIRUBINUA, OCCULTUA, NITRITE, UROBILINOGEN, LEUKOCYTESUR, RBCUA, WBCUA, BACTERIA, SQUAMEPITHEL, HYALINECASTS in the last 48 hours.    Invalid input(s): WRIGHTSUR    Significant Imaging: CXR: I have reviewed all pertinent results/findings within the past 24 hours and my personal findings are:  Enlarged cardiac silhouette with left lung base opacity, possibly atelectasis  EKG: I have reviewed all pertinent results/findings within the past 24 hours and my personal findings are: Atrial fibrillation with rapid ventricular response at 144

## 2019-08-16 NOTE — ASSESSMENT & PLAN NOTE
Review of medical records patient noted to have intermittent wide complex tachycardia during previous admissions.  Likely in the setting of AFib and pacemaker.  - monitor on cardiac telemetry  - optimize lytes

## 2019-08-16 NOTE — ASSESSMENT & PLAN NOTE
Likely in the setting of infection and atrial fibrillation with RVR.  Patient has chronic debility and bed-bound at baseline.

## 2019-08-16 NOTE — HPI
Mrs. Pizano is a 86-year-old female who presented to the ED via EMS with chief complaint of generalized weakness and foul-smelling urine.  Collateral was obtained from daughters who were present at bedside.  Reported yesterday patient with increased somnolence and minimal oral intake associated with increased confusion and generalized weakness.  Daughters state these symptoms usually occur when patient has urinary tract infection, has a history of recurrent UTIs.  Patient has chronic indwelling graves catheter, changed once monthly by home health nurse, unsure date of most recent change.  Low-grade fever at home at 99 as per home health nurse.  On EMS arrival blood pressure was low 80/40 with heart rates up to the 150s.  In the ED patient with atrial fibrillation with rapid ventricular response, received 1 L LR with improvement in blood pressure. She was started on IV amiodarone bolus and infusion as per protocol.  Graves was changed and UA/urine culture was sent in the ED.  She received IV meropenem for empiric coverage as well as 2 g IV magnesium as was noted to have frequent PVCs on telemetry. On my review patient reports generalized weakness and shortness of breath, 's, .

## 2019-08-16 NOTE — ASSESSMENT & PLAN NOTE
Chronic venous stasis changes with wound to the right leg, receiving local wound care.  - wound care consult

## 2019-08-16 NOTE — ED PROVIDER NOTES
Encounter Date: 8/16/2019       History     Chief Complaint   Patient presents with    Weakness     86-year-old female with multiple medical problems including chronic deconditioning, bed ridden, chronic atrial fibrillation on sotalol, non-insulin-dependent diabetes, hypertension, hyperlipidemia, rheumatoid arthritis, hypothyroidism, cardiac pacemaker, lung cancer (nonactive), chronic recurring urinary tract infection, Jim catheter indwelling.  Patient presents to the emergency department from home with complaint of generalized weakness and foul-smelling urine which is noted by home health earlier today.  According to primary caregivers which is the daughter's they state that the mother had been somnolent over the last 24-48 hours.  Upon arrival by nursing home attendants was found to have low blood pressure BP on arrival to the emergency department found to be 80/40 inpatient with an active atrial fibrillation rapid response.  Heart rate is 140 to 150s.  On arrival.  The patient was alert and complaint of generalized weakness. However she denied headache, nausea, chest pain, shortness of breath, abdominal pain. According to family patient does have advanced directives and is DNR, DNI.        Review of patient's allergies indicates:  No Known Allergies  Past Medical History:   Diagnosis Date    Chronic atrial fibrillation     Diabetes mellitus     Hyperlipidemia     Hypertension     PMR (polymyalgia rheumatica)     Rheumatoid arthritis     Thyroid disease      Past Surgical History:   Procedure Laterality Date    APPENDECTOMY      BREAST BIOPSY Right     CARDIAC PACEMAKER PLACEMENT      CATARACT EXTRACTION Right     CHOLECYSTECTOMY      HYSTERECTOMY      JOINT REPLACEMENT Left     knee    TONSILLECTOMY       Family History   Problem Relation Age of Onset    Heart disease Mother     Cancer Father         Lung CA     Social History     Tobacco Use    Smoking status: Former Smoker     Packs/day:  1.00     Years: 10.00     Pack years: 10.00     Types: Cigarettes     Last attempt to quit: 1967     Years since quittin.6    Smokeless tobacco: Never Used   Substance Use Topics    Alcohol use: No    Drug use: No     Review of Systems   Constitutional: Positive for fatigue. Negative for chills and fever.   HENT: Negative for congestion, postnasal drip, rhinorrhea, sinus pain and trouble swallowing.    Eyes: Negative for photophobia and visual disturbance.   Respiratory: Negative for chest tightness, shortness of breath and wheezing.    Cardiovascular: Positive for palpitations. Negative for chest pain and leg swelling.   Gastrointestinal: Negative for abdominal pain, blood in stool, diarrhea, nausea and vomiting.   Endocrine: Negative for polydipsia, polyphagia and polyuria.   Genitourinary: Positive for dysuria (Cloudy urine noted in Jim bag and tubing). Negative for flank pain, urgency, vaginal bleeding and vaginal discharge.   Musculoskeletal: Negative for back pain and gait problem.   Skin: Negative for rash.   Neurological: Positive for weakness (Generalized weakness). Negative for tremors, seizures, numbness and headaches.   Hematological: Does not bruise/bleed easily.   Psychiatric/Behavioral: Negative for confusion and hallucinations.   All other systems reviewed and are negative.      Physical Exam     Initial Vitals [19 1625]   BP Pulse Resp Temp SpO2   (!) 106/51 (!) 160 (!) 24 99.7 °F (37.6 °C) (!) 94 %      MAP       --         Physical Exam    Nursing note and vitals reviewed.  Constitutional: She appears well-nourished.   Elderly appearing chronically ill patient in no acute distress   HENT:   Head: Normocephalic and atraumatic.   Nose: Nose normal.   Mouth/Throat: Oropharynx is clear and moist.   Eyes: Conjunctivae and EOM are normal. Pupils are equal, round, and reactive to light. No scleral icterus.   Neck: Normal range of motion. Neck supple. No thyromegaly present. No tracheal  deviation present.   Cardiovascular: Normal heart sounds and intact distal pulses. Exam reveals no gallop and no friction rub.    No murmur heard.  Tachycardic, with irregular irregular rhythm   Pulmonary/Chest: Breath sounds normal. No stridor. No respiratory distress.   Course bilateral breath sounds throughout adventitious sounds   Abdominal: Soft. Bowel sounds are normal. She exhibits no mass. There is no rebound and no guarding.   Genitourinary: Vagina normal.   Genitourinary Comments: Jim catheter intact with purulent cloudy urine   Musculoskeletal: Normal range of motion. She exhibits no edema.   Lymphadenopathy:     She has no cervical adenopathy.   Neurological: She is alert and oriented to person, place, and time. She has normal strength and normal reflexes. GCS score is 15. GCS eye subscore is 4. GCS verbal subscore is 5. GCS motor subscore is 6.   Skin: Skin is warm and dry. Capillary refill takes less than 2 seconds.   Multiple abdominal ecchymosis noted   Psychiatric: She has a normal mood and affect.         ED Course   Procedures  Labs Reviewed   CBC W/ AUTO DIFFERENTIAL - Abnormal; Notable for the following components:       Result Value    RBC 3.29 (*)     Hemoglobin 9.8 (*)     Hematocrit 32.0 (*)     Mean Corpuscular Hemoglobin Conc 30.6 (*)     RDW 17.6 (*)     Platelets 138 (*)     Immature Granulocytes 1.7 (*)     Immature Grans (Abs) 0.12 (*)     Gran% 76.7 (*)     Lymph% 14.4 (*)     All other components within normal limits   COMPREHENSIVE METABOLIC PANEL - Abnormal; Notable for the following components:    Glucose 186 (*)     BUN, Bld 53 (*)     Calcium 8.6 (*)     Albumin 2.5 (*)     eGFR if  58.9 (*)     eGFR if non  51.1 (*)     All other components within normal limits   B-TYPE NATRIURETIC PEPTIDE - Abnormal; Notable for the following components:     (*)     All other components within normal limits   TROPONIN I - Abnormal; Notable for the  following components:    Troponin I 0.057 (*)     All other components within normal limits    Narrative:        critical result(s) called and verbal readback obtained from   Maile Giron RN, 08/16/2019 17:38   ISTAT PROCEDURE - Abnormal; Notable for the following components:    POC PH 7.469 (*)     POC PCO2 34.4 (*)     POC PO2 61 (*)     POC SATURATED O2 93 (*)     All other components within normal limits   CULTURE, BLOOD   CULTURE, BLOOD   MAGNESIUM   PROTIME-INR   APTT   LACTIC ACID, PLASMA   URINALYSIS, REFLEX TO URINE CULTURE   ISTAT LACTATE   POCT LACTATE     EKG Readings: (Independently Interpreted)   Initial Reading: No STEMI. Rhythm: Atrial Fibrillation. Axis: Normal. Clinical Impression: Atrial Fibrillation with RVR   Patient with nonspecific T-wave changes.     ECG Results          EKG 12-lead (In process)  Result time 08/16/19 16:47:36    In process by Interface, Lab In [facility] (08/16/19 16:47:36)                 Narrative:    Test Reason : A41.9,    Vent. Rate : 144 BPM     Atrial Rate : 150 BPM     P-R Int : 000 ms          QRS Dur : 068 ms      QT Int : 266 ms       P-R-T Axes : 000 044 165 degrees     QTc Int : 411 ms    Atrial fibrillation with rapid ventricular response  Marked ST abnormality, possible lateral subendocardial injury  Abnormal ECG  No previous ECGs available    Referred By: [email or web address]   SELF           Confirmed By:                             Imaging Results          X-Ray Chest AP Portable (Final result)  Result time 08/16/19 17:44:51    Final result by Floyd Wells MD (08/16/19 17:44:51)                 Impression:      Left lung base pleuroparenchymal opacity could reflect airspace disease, atelectasis, pleural effusion, or some combination thereof.  Follow-up PA and lateral chest radiography may be helpful.      Electronically signed by: Floyd Wells MD  Date:    08/16/2019  Time:    17:44             Narrative:    EXAMINATION:  XR CHEST AP PORTABLE    CLINICAL  HISTORY:  Sepsis;    COMPARISON:  06/05/2019    FINDINGS:  Cardiac silhouette size is enlarged.  Atherosclerotic calcification of the aorta.  Left subclavian pacing leads are in stable position.  There is pleuroparenchymal opacity at the left lung base.  Right lung is clear.  No pneumothorax.  No acute osseous abnormality.                                 Medical Decision Making:   Initial Assessment:   86-year-old female with history of multiple medical problems including atrial fibrillation, chronic deconditioning, bedbound, recurring urinary tract infections with indwelling Jim catheter presents to the emergency department with hypotension, urinary tract infection and atrial fibrillation rapid ventricular response  Differential Diagnosis:   Sepsis, septic shock, cardiac arrhythmia, atrial fibrillation rapid ventricular response, volume depletion, dehydration  Clinical Tests:   Lab Tests: Ordered and Reviewed  Radiological Study: Ordered and Reviewed  Medical Tests: Ordered and Reviewed              Attending Attestation:         Attending Critical Care:   Critical Care Times:   Direct Patient Care (initial evaluation, reassessments, and time considering the case)................................................................30 minutes.   Additional History from reviewing old medical records or taking additional history from the family, EMS, PCP, etc.......................15 minutes.   Ordering, Reviewing, and Interpreting Diagnostic Studies...............................................................................................................10 minutes.   Documentation..................................................................................................................................................................................15 minutes.   Consultation with other Physicians.  .................................................................................................................................................10 minutes.   Consultation with the patient's family directly relating to the patient's condition, care, and DNR status (when patient unable)......10 minutes.   ==============================================================  · Total Critical Care Time - exclusive of procedural time: 90 minutes.  ==============================================================  Critical care was necessary to treat or prevent imminent or life-threatening deterioration of the following conditions: cardiac arrhythmia, dehydration and sepsis.   Critical care was time spent personally by me on the following activities: obtaining history from patient or relative, examination of patient, review of x-rays / CT sent with the patient, review of old charts, ordering lab, x-rays, and/or EKG, development of treatment plan with patient or relative, discussion with consultants, re-evaluation of patient's conition and end of life discussions.   Critical Care Condition: potentially life-threatening                  Clinical Impression:       ICD-10-CM ICD-9-CM   1. Paroxysmal atrial fibrillation with rapid ventricular response I48.0 427.31   2. Sepsis A41.9 038.9     995.91   3. Urinary tract infection associated with indwelling urethral catheter, initial encounter T83.511A 996.64    N39.0 599.0                                Zaki Livingston MD  08/16/19 [id number]

## 2019-08-16 NOTE — ASSESSMENT & PLAN NOTE
Known history of chronic atrial fibrillation, on sotalol and metoprolol at home.  Currently in atrial fibrillation with RVR, previously was hypotensive but improved status post 1 L IV fluid.  Started on amiodarone bolus/loading protocol in the ED.  On Eliquis for anticoagulation.  - continue cardiac telemetry  - complete amiodarone infusion  - check TSH  - keep K>4, Mg >2  - continue sotalol and metoprolol  - consult Cardiology

## 2019-08-16 NOTE — ASSESSMENT & PLAN NOTE
As per EMS BP 80/40 with rapid ventricular response.  Improved status post 1 L IV fluids.  - hold losartan and torsemide  - control heart rate  - clinically monitor blood pressure

## 2019-08-16 NOTE — ASSESSMENT & PLAN NOTE
As per collateral yesterday it was increasingly confused with hallucinations.  Much improved today.  Likely secondary to UTI.

## 2019-08-16 NOTE — H&P
[facility]  History & Physical    Patient Name: Vee Pizano  MRN: [id number]  Admission Date: 8/16/2019  Attending Physician: Jillian Justice MD  Primary Care Provider: Noah Joseph MD         Patient information was obtained from patient, relative(s), past medical records and ER records.     Subjective:     Principal Problem:Sepsis    Chief Complaint:   Chief Complaint   Patient presents with    Weakness        HPI: Mrs. Pizano is a 86-year-old female who presented to the ED via EMS with chief complaint of generalized weakness and foul-smelling urine.  Collateral was obtained from daughters who were present at bedside.  Reported yesterday patient with increased somnolence and minimal oral intake associated with increased confusion and generalized weakness.  Daughters state these symptoms usually occur when patient has urinary tract infection, has a history of recurrent UTIs.  Patient has chronic indwelling graves catheter, changed once monthly by home health nurse, unsure date of most recent change.  Low-grade fever at home at 99 as per home health nurse.  On EMS arrival blood pressure was low 80/40 with heart rates up to the 150s.  In the ED patient with atrial fibrillation with rapid ventricular response, received 1 L LR with improvement in blood pressure. She was started on IV amiodarone bolus and infusion as per protocol.  Graves was changed and UA/urine culture was sent in the ED.  She received IV meropenem for empiric coverage as well as 2 g IV magnesium as was noted to have frequent PVCs on telemetry. On my review patient reports generalized weakness and shortness of breath, 's, .    Past Medical History:   Diagnosis Date    Chronic atrial fibrillation     Diabetes mellitus     Hyperlipidemia     Hypertension     PMR (polymyalgia rheumatica)     Rheumatoid arthritis     Thyroid disease        Past Surgical History:   Procedure Laterality Date     APPENDECTOMY      BREAST BIOPSY Right     CARDIAC PACEMAKER PLACEMENT      CATARACT EXTRACTION Right     CHOLECYSTECTOMY      HYSTERECTOMY      JOINT REPLACEMENT Left     knee    TONSILLECTOMY         Review of patient's allergies indicates:  No Known Allergies    No current facility-administered medications on file prior to encounter.      Current Outpatient Medications on File Prior to Encounter   Medication Sig    allopurinol (ZYLOPRIM) 100 MG tablet Take 100 mg by mouth 2 (two) times daily.     apixaban (ELIQUIS) 5 mg Tab Take 5 mg by mouth 2 (two) times daily.    aspirin 81 MG Chew Take 81 mg by mouth every morning.     clotrimazole-betamethasone 1-0.05% (LOTRISONE) cream Apply 1 g topically 2 (two) times daily.    esomeprazole (NEXIUM) 40 MG capsule Take 40 mg by mouth every morning.     gabapentin (NEURONTIN) 100 MG capsule Take 1 capsule (100 mg total) by mouth 3 (three) times daily.    levothyroxine (SYNTHROID) 150 MCG tablet Take 150 mcg by mouth every morning.     losartan (COZAAR) 50 MG tablet Take 50 mg by mouth every morning.     metoprolol succinate (TOPROL-XL) 50 MG 24 hr tablet Take 50 mg by mouth every evening.    potassium chloride SA (K-DUR,KLOR-CON) 20 MEQ tablet Take 40 mEq by mouth 2 (two) times daily.    predniSONE (DELTASONE) 2.5 MG tablet Take 1 tablet (2.5 mg total) by mouth 3 (three) times daily. NEEDS APPT (Patient taking differently: Take 2.5 mg by mouth 3 (three) times daily. )    sertraline (ZOLOFT) 50 MG tablet Take 50 mg by mouth every morning.    sotalol (BETAPACE) 80 MG tablet Take 80 mg by mouth 2 (two) times daily.     torsemide (DEMADEX) 20 MG Tab Take 20 mg by mouth 2 (two) times daily.    VITAMIN D2 50,000 unit capsule Take 50,000 Units by mouth every 7 days. ON MONDAYS    LANTUS U-100 INSULIN 100 unit/mL injection Inject 10 Units into the skin daily as needed.     NOVOLOG FLEXPEN U-100 INSULIN 100 unit/mL InPn pen Inject 5 Units into the skin 3  (three) times daily as needed (per sliding scale, hold for BGL<150).     traMADol (ULTRAM) 50 mg tablet Take 50 mg by mouth every 4 (four) hours as needed.     [DISCONTINUED] amLODIPine (NORVASC) 5 MG tablet Take by mouth.    [DISCONTINUED] CYANOCOBALAMIN, VITAMIN B-12, (VITAMIN B-12 ORAL) Take by mouth.    [DISCONTINUED] hydroCHLOROthiazide (HYDRODIURIL) 25 MG tablet Take by mouth.    [DISCONTINUED] lisinopril 10 MG tablet     [DISCONTINUED] simvastatin (ZOCOR) 20 MG tablet Take by mouth.    [DISCONTINUED] TEKTURNA 300 mg Tab     [DISCONTINUED] TRUE METRIX GLUCOSE TEST STRIP Strp     [DISCONTINUED] TRUEPLUS LANCETS 28 gauge Misc      Family History     Problem Relation (Age of Onset)    Cancer Father    Heart disease Mother        Tobacco Use    Smoking status: Former Smoker     Packs/day: 1.00     Years: 10.00     Pack years: 10.00     Types: Cigarettes     Last attempt to quit: 1967     Years since quittin.6    Smokeless tobacco: Never Used   Substance and Sexual Activity    Alcohol use: No    Drug use: No    Sexual activity: Not on file     Review of Systems   Constitutional: Positive for appetite change and fatigue. Negative for chills, diaphoresis and fever.   HENT: Negative for congestion, sinus pressure, sinus pain and sore throat.    Eyes: Negative for photophobia, discharge, redness and itching.   Respiratory: Positive for shortness of breath. Negative for cough and wheezing.    Cardiovascular: Positive for palpitations and leg swelling. Negative for chest pain.   Gastrointestinal: Negative for abdominal pain, constipation, nausea and vomiting.   Endocrine: Negative for polydipsia and polyuria.   Genitourinary:        Chronic indwelling Jim with foul-smelling urine   Musculoskeletal: Positive for back pain and gait problem.   Skin: Positive for rash and wound.   Neurological: Positive for weakness (Generalized). Negative for facial asymmetry, speech difficulty, numbness and headaches.    Hematological: Bruises/bleeds easily.   Psychiatric/Behavioral: Positive for confusion and hallucinations. Negative for agitation and suicidal ideas.     Objective:     Vital Signs (Most Recent):  Temp: 99.7 °F (37.6 °C) (08/16/19 1625)  Pulse: (!) 139 (08/16/19 1736)  Resp: (!) 24 (08/16/19 1736)  BP: (!) 106/59 (08/16/19 1736)  SpO2: 97 % (08/16/19 1736) Vital Signs (24h Range):  Temp:  [99.7 °F (37.6 °C)] 99.7 °F (37.6 °C)  Pulse:  [129-160] 139  Resp:  [23-26] 24  SpO2:  [91 %-97 %] 97 %  BP: ()/(47-59) 106/59        Body mass index is 29.29 kg/m².    Physical Exam   Constitutional: She is oriented to person, place, and time. No distress.   chronically ill-appearing obese female, appears fatigued, calm, cooperative   HENT:   Head: Normocephalic and atraumatic.   Mouth/Throat: No oropharyngeal exudate.   Eyes: Pupils are equal, round, and reactive to light. Conjunctivae and EOM are normal. Right eye exhibits no discharge. Left eye exhibits no discharge.   Neck: Normal range of motion. Neck supple.   Cardiovascular:   Irregular, tachycardic, + peripheral edema, reduced but palpable peripheral pulses   Pulmonary/Chest: No respiratory distress. She has no wheezes.   On supplemental O2 via NC, decreased air entry at bases   Abdominal:   Protuberant abdomen with pannus, soft and non tender   Genitourinary:   Genitourinary Comments: Graves with graves smell (present during changing)   Musculoskeletal:   Diffuse OA changes   Neurological: She is alert and oriented to person, place, and time.   Able to lift legs briefly against gravity   Skin: She is not diaphoretic.   Chronic bilateral LE venous stasis changes with trophic nail changes, wound on the lateral aspect of R leg, sacral wound   Psychiatric:   No hallucinations   Nursing note and vitals reviewed.        CRANIAL NERVES     CN I  cranial nerve I not tested    CN III, IV, VI   Pupils are equal, round, and reactive to light.  Extraocular motions are normal.      CN V   Facial sensation intact.     CN VII   Facial expression full, symmetric.        Significant Labs:   Blood Culture: No results for input(s): LABBLOO in the last 48 hours.  BMP:   Recent Labs   Lab 08/16/19  1653   *      K 3.5   CL 96   CO2 29   BUN 53*   CREATININE 1.0   CALCIUM 8.6*   MG 1.6     CBC:   Recent Labs   Lab 08/16/19  1653   WBC 7.08   HGB 9.8*   HCT 32.0*   *     Magnesium:   Recent Labs   Lab 08/16/19  1653   MG 1.6     Troponin:   Recent Labs   Lab 08/16/19  1653   TROPONINI 0.057*     Urine Studies: No results for input(s): COLORU, APPEARANCEUA, PHUR, SPECGRAV, PROTEINUA, GLUCUA, KETONESU, BILIRUBINUA, OCCULTUA, NITRITE, UROBILINOGEN, LEUKOCYTESUR, RBCUA, WBCUA, BACTERIA, SQUAMEPITHEL, HYALINECASTS in the last 48 hours.    Invalid input(s): WRIGHTSUR    Significant Imaging: CXR: I have reviewed all pertinent results/findings within the past 24 hours and my personal findings are:  Enlarged cardiac silhouette with left lung base opacity, possibly atelectasis  EKG: I have reviewed all pertinent results/findings within the past 24 hours and my personal findings are: Atrial fibrillation with rapid ventricular response at 144    Assessment/Plan:     * Sepsis  Presenting with hypotension and tachycardia (A fib RVR) with likely UTI. She only received 1 L fluid given CHF history and complaints of shortness of breath. Her SBP improved to 150's on my encounter. Lactate < 2.   - hold further iv fluids, hold torsemide  - follow up pan cultures      UTI (urinary tract infection)  History of recurrent UTIs, has chronic indwelling graves catheter changed by home health nurse once per month.  Prior urine cultures with Pseudomonas, Proteus, Klebsiella.  Urine is foul smelling.  - graves catheter was changed in the ED   - follow up pending urine culture  - continue iv meropenem  - should consider chronic antibiotic suppressant therapy       Atrial fibrillation with RVR  Known history of  chronic atrial fibrillation, on sotalol and metoprolol at home.  Currently in atrial fibrillation with RVR, previously was hypotensive but improved status post 1 L IV fluid.  Started on amiodarone bolus/loading protocol in the ED.  On Eliquis for anticoagulation.  - continue cardiac telemetry  - complete amiodarone infusion  - check TSH  - keep K>4, Mg >2  - continue sotalol and metoprolol  - consult Cardiology      Generalized weakness on background of chronic debility  Likely in the setting of infection and atrial fibrillation with RVR.  Patient has chronic debility and bed-bound at baseline.      Infectious encephalopathy  As per collateral yesterday it was increasingly confused with hallucinations.  Much improved today.  Likely secondary to UTI.      Hypotension  As per EMS BP 80/40 with rapid ventricular response.  Improved status post 1 L IV fluids.  - hold losartan and torsemide  - control heart rate  - clinically monitor blood pressure      Chronic venous stasis with right leg wound  Chronic venous stasis changes with wound to the right leg, receiving local wound care.  - wound care consult      Elevated troponin  Likely secondary to demand with sepsis as well as tachyarrhythmia.  - trend cardiac biomarkers    Anemia        Advanced care planning/counseling discussion  Discussions with patient and 2 daughters present at bedside about code status.  Patient expressed wishes to be do not resuscitate, would want aggressive medical care but no chest compressions, intubation, life support.  Daughters in agreement stating aware low chances of successful resuscitation as well as current poor quality of life.      Wide-complex tachycardia  Review of medical records patient noted to have intermittent wide complex tachycardia during previous admissions.  Likely in the setting of AFib and pacemaker.  - monitor on cardiac telemetry  - optimize lytes      Chronic diastolic heart failure  Recent hospitalization for  decompensated heart failure, EF of 60%.  She has chronic lower extremity swelling/3rd spacing.  - hold torsemide      Sacral wound  Sacral wound, POA, likely in the setting of chronic bed-bound status.  - wound care consult        VTE Risk Mitigation (From admission, onward)        Ordered     apixaban tablet 5 mg  2 times daily      08/16/19 6697             Jillian Justice MD  Department of Hospital Medicine   [facility]

## 2019-08-16 NOTE — ASSESSMENT & PLAN NOTE
History of recurrent UTIs, has chronic indwelling graves catheter changed by home health nurse once per month.  Prior urine cultures with Pseudomonas, Proteus, Klebsiella.  Urine is foul smelling.  - graves catheter was changed in the ED   - follow up pending urine culture  - continue iv meropenem  - should consider chronic antibiotic suppressant therapy

## 2019-08-16 NOTE — ED NOTES
7 continuous beats of V tach noted to cardiac monitor. Reported to Dr. Livingston. Patient denies CP, denies palpitations, awake and alert. See new orders for amiodarone drip.

## 2019-08-16 NOTE — ASSESSMENT & PLAN NOTE
Recent hospitalization for decompensated heart failure, EF of 60%.  She has chronic lower extremity swelling/3rd spacing.  - hold torsemide

## 2019-08-17 LAB
ANION GAP SERPL CALC-SCNC: 14 MMOL/L (ref 8–16)
BUN SERPL-MCNC: 54 MG/DL (ref 8–23)
CALCIUM SERPL-MCNC: 8.7 MG/DL (ref 8.7–10.5)
CHLORIDE SERPL-SCNC: 96 MMOL/L (ref 95–110)
CO2 SERPL-SCNC: 25 MMOL/L (ref 23–29)
CREAT SERPL-MCNC: 1.1 MG/DL (ref 0.5–1.4)
ERYTHROCYTE [DISTWIDTH] IN BLOOD BY AUTOMATED COUNT: 17.9 % (ref 11.5–14.5)
EST. GFR  (AFRICAN AMERICAN): 52.5 ML/MIN/1.73 M^2
EST. GFR  (NON AFRICAN AMERICAN): 45.6 ML/MIN/1.73 M^2
ESTIMATED AVG GLUCOSE: 192 MG/DL (ref 68–131)
GLUCOSE SERPL-MCNC: 195 MG/DL (ref 70–110)
GLUCOSE SERPL-MCNC: 221 MG/DL (ref 70–110)
GLUCOSE SERPL-MCNC: 243 MG/DL (ref 70–110)
GLUCOSE SERPL-MCNC: 260 MG/DL (ref 70–110)
GLUCOSE SERPL-MCNC: 282 MG/DL (ref 70–110)
HBA1C MFR BLD HPLC: 8.3 % (ref 4.5–6.2)
HCT VFR BLD AUTO: 33.3 % (ref 37–48.5)
HGB BLD-MCNC: 9.9 G/DL (ref 12–16)
MAGNESIUM SERPL-MCNC: 1.7 MG/DL (ref 1.6–2.6)
MCH RBC QN AUTO: 29.6 PG (ref 27–31)
MCHC RBC AUTO-ENTMCNC: 29.7 G/DL (ref 32–36)
MCV RBC AUTO: 99 FL (ref 82–98)
PHOSPHATE SERPL-MCNC: 4 MG/DL (ref 2.7–4.5)
PLATELET # BLD AUTO: 130 K/UL (ref 150–350)
PMV BLD AUTO: 10.4 FL (ref 9.2–12.9)
POTASSIUM SERPL-SCNC: 3.4 MMOL/L (ref 3.5–5.1)
RBC # BLD AUTO: 3.35 M/UL (ref 4–5.4)
SODIUM SERPL-SCNC: 135 MMOL/L (ref 136–145)
TROPONIN I SERPL DL<=0.01 NG/ML-MCNC: 0.13 NG/ML (ref 0.02–0.04)
WBC # BLD AUTO: 8.94 K/UL (ref 3.9–12.7)

## 2019-08-17 PROCEDURE — 83735 ASSAY OF MAGNESIUM: CPT

## 2019-08-17 PROCEDURE — 84484 ASSAY OF TROPONIN QUANT: CPT

## 2019-08-17 PROCEDURE — 63600175 PHARM REV CODE 636 W HCPCS: Performed by: INTERNAL MEDICINE

## 2019-08-17 PROCEDURE — 21000000 HC CCU ICU ROOM CHARGE

## 2019-08-17 PROCEDURE — 21400001 HC TELEMETRY ROOM

## 2019-08-17 PROCEDURE — 84100 ASSAY OF PHOSPHORUS: CPT

## 2019-08-17 PROCEDURE — 85027 COMPLETE CBC AUTOMATED: CPT

## 2019-08-17 PROCEDURE — 36415 COLL VENOUS BLD VENIPUNCTURE: CPT

## 2019-08-17 PROCEDURE — 80048 BASIC METABOLIC PNL TOTAL CA: CPT

## 2019-08-17 PROCEDURE — 94761 N-INVAS EAR/PLS OXIMETRY MLT: CPT

## 2019-08-17 PROCEDURE — 25000003 PHARM REV CODE 250: Performed by: INTERNAL MEDICINE

## 2019-08-17 RX ORDER — SODIUM CHLORIDE 9 MG/ML
INJECTION, SOLUTION INTRAVENOUS CONTINUOUS
Status: DISCONTINUED | OUTPATIENT
Start: 2019-08-17 | End: 2019-08-18

## 2019-08-17 RX ORDER — PREDNISONE 1 MG/1
2 TABLET ORAL 3 TIMES DAILY
Status: DISCONTINUED | OUTPATIENT
Start: 2019-08-17 | End: 2019-08-23 | Stop reason: HOSPADM

## 2019-08-17 RX ADMIN — POTASSIUM CHLORIDE 40 MEQ: 20 SOLUTION ORAL at 10:08

## 2019-08-17 RX ADMIN — ASPIRIN 81 MG 81 MG: 81 TABLET ORAL at 06:08

## 2019-08-17 RX ADMIN — PREDNISONE 2 MG: 1 TABLET ORAL at 08:08

## 2019-08-17 RX ADMIN — LEVOTHYROXINE SODIUM 150 MCG: 0.03 TABLET ORAL at 06:08

## 2019-08-17 RX ADMIN — GABAPENTIN 100 MG: 100 CAPSULE ORAL at 08:08

## 2019-08-17 RX ADMIN — MEROPENEM 500 MG: 500 INJECTION, POWDER, FOR SOLUTION INTRAVENOUS at 05:08

## 2019-08-17 RX ADMIN — PANTOPRAZOLE SODIUM 40 MG: 40 TABLET, DELAYED RELEASE ORAL at 06:08

## 2019-08-17 RX ADMIN — APIXABAN 5 MG: 5 TABLET, FILM COATED ORAL at 08:08

## 2019-08-17 RX ADMIN — POLYETHYLENE GLYCOL 3350 17 G: 17 POWDER, FOR SOLUTION ORAL at 08:08

## 2019-08-17 RX ADMIN — AMIODARONE HYDROCHLORIDE 0.5 MG/MIN: 1.8 INJECTION, SOLUTION INTRAVENOUS at 10:08

## 2019-08-17 RX ADMIN — MAGNESIUM OXIDE 800 MG: 400 TABLET ORAL at 10:08

## 2019-08-17 RX ADMIN — SERTRALINE HYDROCHLORIDE 50 MG: 50 TABLET ORAL at 06:08

## 2019-08-17 RX ADMIN — ALLOPURINOL 100 MG: 100 TABLET ORAL at 08:08

## 2019-08-17 RX ADMIN — GABAPENTIN 100 MG: 100 CAPSULE ORAL at 04:08

## 2019-08-17 RX ADMIN — INSULIN ASPART 4 UNITS: 100 INJECTION, SOLUTION INTRAVENOUS; SUBCUTANEOUS at 05:08

## 2019-08-17 RX ADMIN — AMIODARONE HYDROCHLORIDE 1 MG/MIN: 1.8 INJECTION, SOLUTION INTRAVENOUS at 12:08

## 2019-08-17 RX ADMIN — MEROPENEM 500 MG: 500 INJECTION, POWDER, FOR SOLUTION INTRAVENOUS at 06:08

## 2019-08-17 RX ADMIN — SODIUM CHLORIDE: 0.9 INJECTION, SOLUTION INTRAVENOUS at 05:08

## 2019-08-17 NOTE — HOSPITAL COURSE
Blood culture growing Gram negative rods  Atrial fibrillation with RVR is now rate controlled  She was treated with Iv antibiotics and then transiioned to oral cipro to complete 7 days of treatment for E. Coli bacteremia. Amiodarone will be stopd while she is on therapy

## 2019-08-17 NOTE — PROGRESS NOTES
[facility]  Progress Note    Patient Name: Vee Pizano  MRN: [id number]  Patient Class: IP- Inpatient   Admission Date: 8/16/2019  Length of Stay: 1 days  Attending Physician: Urbano Barnett MD  Primary Care Provider: Noah Joseph MD        Subjective:     Principal Problem:Sepsis        HPI:  Mrs. Pizano is a 86-year-old female who presented to the ED via EMS with chief complaint of generalized weakness and foul-smelling urine.  Collateral was obtained from daughters who were present at bedside.  Reported yesterday patient with increased somnolence and minimal oral intake associated with increased confusion and generalized weakness.  Daughters state these symptoms usually occur when patient has urinary tract infection, has a history of recurrent UTIs.  Patient has chronic indwelling graves catheter, changed once monthly by home health nurse, unsure date of most recent change.  Low-grade fever at home at 99 as per home health nurse.  On EMS arrival blood pressure was low 80/40 with heart rates up to the 150s.  In the ED patient with atrial fibrillation with rapid ventricular response, received 1 L LR with improvement in blood pressure. She was started on IV amiodarone bolus and infusion as per protocol.  Graves was changed and UA/urine culture was sent in the ED.  She received IV meropenem for empiric coverage as well as 2 g IV magnesium as was noted to have frequent PVCs on telemetry. On my review patient reports generalized weakness and shortness of breath, 's, .    Overview/Hospital Course:  Patient much better  Patient's mental status is back to baseline  Atrial fibrillation with RVR is now rate controlled  Patient is on chronic steroids for polymyalgia rheumatica  She denies any cough any cough or shortness of breath    Interval History:     Review of Systems   Constitutional: Negative for activity change and appetite change.   HENT: Negative for congestion and  dental problem.    Eyes: Negative for discharge and itching.   Respiratory: Negative for shortness of breath.    Cardiovascular: Negative for chest pain.   Gastrointestinal: Negative for abdominal distention and abdominal pain.   Endocrine: Negative for cold intolerance.   Genitourinary: Negative for difficulty urinating and dysuria.   Musculoskeletal: Negative for arthralgias and back pain.   Skin: Negative for color change.   Neurological: Negative for dizziness and facial asymmetry.   Hematological: Negative for adenopathy.   Psychiatric/Behavioral: Negative for agitation and behavioral problems.     Objective:     Vital Signs (Most Recent):  Temp: 98 °F (36.7 °C) (08/17/19 1100)  Pulse: 94 (08/17/19 1100)  Resp: 20 (08/17/19 1100)  BP: (!) 94/50 (08/17/19 1100)  SpO2: 95 % (08/17/19 1100) Vital Signs (24h Range):  Temp:  [97.6 °F (36.4 °C)-99.7 °F (37.6 °C)] 98 °F (36.7 °C)  Pulse:  [] 94  Resp:  [19-26] 20  SpO2:  [85 %-97 %] 95 %  BP: ()/(46-60) 94/50     Weight: 84.4 kg (186 lb 1.1 oz)  Body mass index is 35.16 kg/m².    Intake/Output Summary (Last 24 hours) at 8/17/2019 1502  Last data filed at 8/16/2019 2048  Gross per 24 hour   Intake 250 ml   Output --   Net 250 ml      Physical Exam   Constitutional: She is oriented to person, place, and time. No distress.   Eyes: Pupils are equal, round, and reactive to light. EOM are normal.   Neck: Neck supple.   Cardiovascular: Normal rate.   Pulmonary/Chest: Effort normal and breath sounds normal.   Abdominal: Soft. Bowel sounds are normal.   Musculoskeletal: Normal range of motion. She exhibits no edema.   Neurological: She is alert and oriented to person, place, and time.   Skin: Skin is warm.   Psychiatric: She has a normal mood and affect.   Nursing note and vitals reviewed.      Significant Labs:   CBC:   Recent Labs   Lab 08/16/19  1653 08/17/19  0432   WBC 7.08 8.94   HGB 9.8* 9.9*   HCT 32.0* 33.3*   * 130*     CMP:   Recent Labs   Lab  08/16/19  1653 08/17/19  0432    135*   K 3.5 3.4*   CL 96 96   CO2 29 25   * 260*   BUN 53* 54*   CREATININE 1.0 1.1   CALCIUM 8.6* 8.7   PROT 6.1  --    ALBUMIN 2.5*  --    BILITOT 1.0  --    ALKPHOS 66  --    AST 37  --    ALT 24  --    ANIONGAP 12 14   EGFRNONAA 51.1* 45.6*       Significant Imaging: I have reviewed all pertinent imaging results/findings within the past 24 hours.      Assessment/Plan:      * Sepsis  -on iv abx  - follow up pan cultures      Elevated troponin  Likely secondary to demand with sepsis as well as tachyarrhythmia.  - trend cardiac biomarkers    Anemia        Advanced care planning/counseling discussion  Discussions with patient and 2 daughters present at bedside about code status.  Patient expressed wishes to be do not resuscitate, would want aggressive medical care but no chest compressions, intubation, life support.  Daughters in agreement stating aware low chances of successful resuscitation as well as current poor quality of life.      Wide-complex tachycardia  Review of medical records patient noted to have intermittent wide complex tachycardia during previous admissions.  Likely in the setting of AFib and pacemaker.  - monitor on cardiac telemetry  - optimize lytes  -stable issue now    Chronic diastolic heart failure  Recent hospitalization for decompensated heart failure, EF of 60%.  She has chronic lower extremity swelling/3rd spacing.  Stable issue now      Sacral wound  Sacral wound, POA, likely in the setting of chronic bed-bound status.  - wound care consult      Chronic venous stasis with right leg wound  Chronic venous stasis changes with wound to the right leg, receiving local wound care.  - wound care consult      Infectious encephalopathy  As per collateral yesterday it was increasingly confused with hallucinations.  Much improved today.  Likely secondary to UTI.  Almost back to baseline mental status      Generalized weakness on background of chronic  debility  Likely in the setting of infection and atrial fibrillation with RVR.  Patient has chronic debility and bed-bound at baseline.      Hypotension   BP 80/40 with rapid ventricular response upon admission.  Improved status post 1 L IV fluids.  - clinically monitor blood pressure  -Still BP on low normal range      UTI (urinary tract infection)  History of recurrent UTIs, has chronic indwelling graves catheter changed by home health nurse once per month.    Prior urine cultures with Pseudomonas, Proteus, Klebsiella.  Urine is foul smelling.  - graves catheter was changed in the ED   - continue iv meropenem  - should consider chronic antibiotic suppressant therapy       Atrial fibrillation with RVR  Was  in atrial fibrillation with RVR when pt came to ER, previously was hypotensive but improved status post 1 L IV fluid.    Started on amiodarone bolus/loading protocol in the ED.  On Eliquis for anticoagulation.  Rate now controlled         VTE Risk Mitigation (From admission, onward)        Ordered     apixaban tablet 5 mg  2 times daily      08/16/19 1806     IP VTE HIGH RISK PATIENT  Once      08/16/19 2056                Urbano Barnett MD  Department of Hospital Medicine   [facility]

## 2019-08-17 NOTE — SUBJECTIVE & OBJECTIVE
Interval History:     Review of Systems   Constitutional: Negative for activity change and appetite change.   HENT: Negative for congestion and dental problem.    Eyes: Negative for discharge and itching.   Respiratory: Negative for shortness of breath.    Cardiovascular: Negative for chest pain.   Gastrointestinal: Negative for abdominal distention and abdominal pain.   Endocrine: Negative for cold intolerance.   Genitourinary: Negative for difficulty urinating and dysuria.   Musculoskeletal: Negative for arthralgias and back pain.   Skin: Negative for color change.   Neurological: Negative for dizziness and facial asymmetry.   Hematological: Negative for adenopathy.   Psychiatric/Behavioral: Negative for agitation and behavioral problems.     Objective:     Vital Signs (Most Recent):  Temp: 98 °F (36.7 °C) (08/17/19 1100)  Pulse: 94 (08/17/19 1100)  Resp: 20 (08/17/19 1100)  BP: (!) 94/50 (08/17/19 1100)  SpO2: 95 % (08/17/19 1100) Vital Signs (24h Range):  Temp:  [97.6 °F (36.4 °C)-99.7 °F (37.6 °C)] 98 °F (36.7 °C)  Pulse:  [] 94  Resp:  [19-26] 20  SpO2:  [85 %-97 %] 95 %  BP: ()/(46-60) 94/50     Weight: 84.4 kg (186 lb 1.1 oz)  Body mass index is 35.16 kg/m².    Intake/Output Summary (Last 24 hours) at 8/17/2019 1502  Last data filed at 8/16/2019 2048  Gross per 24 hour   Intake 250 ml   Output --   Net 250 ml      Physical Exam   Constitutional: She is oriented to person, place, and time. No distress.   Eyes: Pupils are equal, round, and reactive to light. EOM are normal.   Neck: Neck supple.   Cardiovascular: Normal rate.   Pulmonary/Chest: Effort normal and breath sounds normal.   Abdominal: Soft. Bowel sounds are normal.   Musculoskeletal: Normal range of motion. She exhibits no edema.   Neurological: She is alert and oriented to person, place, and time.   Skin: Skin is warm.   Psychiatric: She has a normal mood and affect.   Nursing note and vitals reviewed.      Significant Labs:   CBC:    Recent Labs   Lab 08/16/19  1653 08/17/19  0432   WBC 7.08 8.94   HGB 9.8* 9.9*   HCT 32.0* 33.3*   * 130*     CMP:   Recent Labs   Lab 08/16/19 1653 08/17/19  0432    135*   K 3.5 3.4*   CL 96 96   CO2 29 25   * 260*   BUN 53* 54*   CREATININE 1.0 1.1   CALCIUM 8.6* 8.7   PROT 6.1  --    ALBUMIN 2.5*  --    BILITOT 1.0  --    ALKPHOS 66  --    AST 37  --    ALT 24  --    ANIONGAP 12 14   EGFRNONAA 51.1* 45.6*       Significant Imaging: I have reviewed all pertinent imaging results/findings within the past 24 hours.

## 2019-08-17 NOTE — ASSESSMENT & PLAN NOTE
BP 80/40 with rapid ventricular response upon admission.  Improved status post 1 L IV fluids.  - clinically monitor blood pressure  -Still BP on low normal range

## 2019-08-17 NOTE — ASSESSMENT & PLAN NOTE
Review of medical records patient noted to have intermittent wide complex tachycardia during previous admissions.  Likely in the setting of AFib and pacemaker.  - monitor on cardiac telemetry  - optimize lytes  -stable issue now

## 2019-08-17 NOTE — PLAN OF CARE
08/16/19 2121   PRE-TX-O2   O2 Device (Oxygen Therapy) room air   SpO2 (!) 93 %   Pulse Oximetry Type Continuous   Pulse (!) 150   Resp (!) 24

## 2019-08-17 NOTE — CONSULTS
[facility]   Cardiology Note    Consult Requested By: Hospitalist   Reason for Consult: afib with rvr   Consults (From admission, onward)        Status Ordering Provider     Inpatient consult to Cardiology  Once     Provider:  Kin Goode MD    Acknowledged JILLIAN VEGA     Inpatient consult to Hospitalist  Once     Provider:  Jillian Vega MD    Acknowledged JILLIAN VEGA     IP consult to case management  Once     Provider:  (Not yet assigned)    Ordered JILLIAN VEGA          SUBJECTIVE:     History of Present Illness:  Patient is a 86-year-old female well known to our clinic she has been in a debilitated state and unable to get out of bed for some time now.  She is followed by home care who called our office for an order for UA as patient's urine was smelling file.  Family noticed that she was quite somnolent and brought into the emergency room for further evaluation.  She was admitted for urosepsis she was hypotensive due to sepsis.  Today she appears much better more awake and alert blood pressure still remains somewhat low but she is asymptomatic. She has a history of paroxysmal atrial fibrillation and has been in AFib with RVR since admit.  Her pacemaker also does not appear to be mode switching probably will have this interrogated.  She has been on antiarrhythmics and anticoagulants.  Her last remote interrogation showed 4% AFib. Troponin is minimally elevated. Likely 2/2 afib with rvr. Pt denies chest pain or worsening shortness of breath.     Review of patient's allergies indicates:  No Known Allergies    Past Medical History:   Diagnosis Date    Chronic atrial fibrillation     Diabetes mellitus     Hyperlipidemia     Hypertension     PMR (polymyalgia rheumatica)     Rheumatoid arthritis     Thyroid disease      Past Surgical History:   Procedure Laterality Date    APPENDECTOMY      BREAST BIOPSY Right     CARDIAC PACEMAKER PLACEMENT      CATARACT  EXTRACTION Right     CHOLECYSTECTOMY      HYSTERECTOMY      JOINT REPLACEMENT Left     knee    TONSILLECTOMY       Family History   Problem Relation Age of Onset    Heart disease Mother     Cancer Father         Lung CA     Social History     Tobacco Use    Smoking status: Former Smoker     Packs/day: 1.00     Years: 10.00     Pack years: 10.00     Types: Cigarettes     Last attempt to quit: 1967     Years since quittin.6    Smokeless tobacco: Never Used   Substance Use Topics    Alcohol use: No    Drug use: No       Review of Systems:  Review of Systems   Respiratory: Positive for shortness of breath.    Cardiovascular: Positive for leg swelling. Negative for orthopnea.   Genitourinary: Positive for dysuria.   Psychiatric/Behavioral: Positive for depression.   All other systems reviewed and are negative.      OBJECTIVE:     Vital Signs (Most Recent)  Temp: 98.4 °F (36.9 °C) (19)  Pulse: 95 (19)  Resp: (!) 21 (19)  BP: (!) 72/47 (19)  SpO2: (!) 94 % (19)    Vital Signs Range (Last 24H):  Temp:  [97.6 °F (36.4 °C)-99.7 °F (37.6 °C)]   Pulse:  []   Resp:  [19-26]   BP: ()/(46-60)   SpO2:  [85 %-97 %]     I & O (Last 24H):    Intake/Output Summary (Last 24 hours) at 2019 09  Last data filed at 2019  Gross per 24 hour   Intake 250 ml   Output --   Net 250 ml       Current Diet:     Current Diet Order   Procedures    Diet diabetic [facility]; 1800 Calorie; Standard Tray     Order Specific Question:   Indicate patient location for additional diet options:     Answer:   [facility]     Order Specific Question:   Total calories:     Answer:   1800 Calorie     Order Specific Question:   Tray type:     Answer:   Standard Tray        Allergies:  Review of patient's allergies indicates:  No Known Allergies    Meds:  Scheduled Meds:   allopurinol  100 mg Oral BID    apixaban  5 mg Oral BID    aspirin  81 mg Oral QAM     clotrimazole-betamethasone 1-0.05%  1 g Topical (Top) BID    gabapentin  100 mg Oral TID    levothyroxine  150 mcg Oral QAM    meropenem (MERREM) IVPB  500 mg Intravenous Q12H    metoprolol succinate  50 mg Oral QHS    pantoprazole  40 mg Oral Daily    polyethylene glycol  17 g Oral Daily    predniSONE  2.5 mg Oral TID    sertraline  50 mg Oral QAM    sotalol  80 mg Oral BID     Continuous Infusions:   amiodarone in dextrose 5% 1 mg/min (08/17/19 0001)     PRN Meds:acetaminophen, bisacodyl, dextrose 50%, dextrose 50%, glucagon (human recombinant), glucose, glucose, insulin aspart U-100, magnesium oxide, magnesium oxide, ondansetron, potassium chloride 10%, potassium chloride 10%, potassium chloride 10%, potassium, sodium phosphates, potassium, sodium phosphates, potassium, sodium phosphates, sodium chloride 0.9%, traMADol, trazodone    Oxygen/Ventilator Data (Last 24H):  (if applicable)        Hemodynamic Parameters (Last 24H):   (if applicable)        Laboratory and Radiology Data:  Recent Results (from the past 24 hour(s))   Blood culture x two cultures. Draw prior to antibiotics.    Collection Time: 08/16/19  4:45 PM   Result Value Ref Range    Blood Culture, Routine No Growth to date    Blood culture x two cultures. Draw prior to antibiotics.    Collection Time: 08/16/19  4:53 PM   Result Value Ref Range    Blood Culture, Routine No Growth to date    CBC auto differential    Collection Time: 08/16/19  4:53 PM   Result Value Ref Range    WBC 7.08 3.90 - 12.70 K/uL    RBC 3.29 (L) 4.00 - 5.40 M/uL    Hemoglobin 9.8 (L) 12.0 - 16.0 g/dL    Hematocrit 32.0 (L) 37.0 - 48.5 %    Mean Corpuscular Volume 97 82 - 98 fL    Mean Corpuscular Hemoglobin 29.8 27.0 - 31.0 pg    Mean Corpuscular Hemoglobin Conc 30.6 (L) 32.0 - 36.0 g/dL    RDW 17.6 (H) 11.5 - 14.5 %    Platelets 138 (L) 150 - 350 K/uL    MPV 10.6 9.2 - 12.9 fL    Immature Granulocytes 1.7 (H) 0.0 - 0.5 %    Gran # (ANC) 5.4 1.8 - 7.7 K/uL    Immature  Grans (Abs) 0.12 (H) 0.00 - 0.04 K/uL    Lymph # 1.0 1.0 - 4.8 K/uL    Mono # 0.3 0.3 - 1.0 K/uL    Eos # 0.2 0.0 - 0.5 K/uL    Baso # 0.01 0.00 - 0.20 K/uL    nRBC 0 0 /100 WBC    Gran% 76.7 (H) 38.0 - 73.0 %    Lymph% 14.4 (L) 18.0 - 48.0 %    Mono% 4.7 4.0 - 15.0 %    Eosinophil% 2.4 0.0 - 8.0 %    Basophil% 0.1 0.0 - 1.9 %    Differential Method Automated    Comprehensive metabolic panel    Collection Time: 08/16/19  4:53 PM   Result Value Ref Range    Sodium 137 136 - 145 mmol/L    Potassium 3.5 3.5 - 5.1 mmol/L    Chloride 96 95 - 110 mmol/L    CO2 29 23 - 29 mmol/L    Glucose 186 (H) 70 - 110 mg/dL    BUN, Bld 53 (H) 8 - 23 mg/dL    Creatinine 1.0 0.5 - 1.4 mg/dL    Calcium 8.6 (L) 8.7 - 10.5 mg/dL    Total Protein 6.1 6.0 - 8.4 g/dL    Albumin 2.5 (L) 3.5 - 5.2 g/dL    Total Bilirubin 1.0 0.1 - 1.0 mg/dL    Alkaline Phosphatase 66 55 - 135 U/L    AST 37 10 - 40 U/L    ALT 24 10 - 44 U/L    Anion Gap 12 8 - 16 mmol/L    eGFR if African American 58.9 (A) >60 mL/min/1.73 m^2    eGFR if non  51.1 (A) >60 mL/min/1.73 m^2   Magnesium    Collection Time: 08/16/19  4:53 PM   Result Value Ref Range    Magnesium 1.6 1.6 - 2.6 mg/dL   Protime-INR    Collection Time: 08/16/19  4:53 PM   Result Value Ref Range    PT 13.3 11.7 - 14.0 sec    INR 1.1    Brain natriuretic peptide    Collection Time: 08/16/19  4:53 PM   Result Value Ref Range     (H) 0 - 99 pg/mL   APTT    Collection Time: 08/16/19  4:53 PM   Result Value Ref Range    aPTT 33.8 26.2 - 34.7 sec   Troponin I    Collection Time: 08/16/19  4:53 PM   Result Value Ref Range    Troponin I 0.057 (HH) 0.020 - 0.040 ng/mL   ISTAT Lactate    Collection Time: 08/16/19  4:54 PM   Result Value Ref Range    POC Lactate 1.22 0.5 - 2.2 mmol/L    Sample VENOUS    ISTAT PROCEDURE    Collection Time: 08/16/19  5:29 PM   Result Value Ref Range    POC PH 7.469 (H) 7.35 - 7.45    POC PCO2 34.4 (L) 35 - 45 mmHg    POC PO2 61 (L) 80 - 100 mmHg    POC HCO3 25.0  24 - 28 mmol/L    POC BE 1 -2 to 2 mmol/L    POC SATURATED O2 93 (L) 95 - 100 %    POC TCO2 26 23 - 27 mmol/L    Sample ARTERIAL     Site RR     Allens Test Pass     DelSys Room Air     Mode SPONT     FiO2 21    Urinalysis, Reflex to Urine Culture Urine, Clean Catch    Collection Time: 08/16/19  6:08 PM   Result Value Ref Range    Specimen UA Urine, Clean Catch     Color, UA Orange (A) Yellow, Straw, Alysia    Appearance, UA Cloudy (A) Clear    pH, UA 6.0 5.0 - 8.0    Specific Gravity, UA 1.010 1.005 - 1.030    Protein, UA 2+ (A) Negative    Glucose, UA Negative Negative    Ketones, UA Negative Negative    Bilirubin (UA) Negative Negative    Occult Blood UA 2+ (A) Negative    Nitrite, UA Negative Negative    Urobilinogen, UA Negative Negative EU/dL    Leukocytes, UA 3+ (A) Negative   Urinalysis Microscopic    Collection Time: 08/16/19  6:08 PM   Result Value Ref Range    RBC, UA 22 (H) 0 - 4 /hpf    WBC, UA >100 (H) 0 - 5 /hpf    Bacteria Occasional None-Occ /hpf    Squam Epithel, UA 38 /hpf    Hyaline Casts,  (A) 0-1/lpf /lpf    Microscopic Comment SEE COMMENT    POCT glucose    Collection Time: 08/16/19  9:23 PM   Result Value Ref Range    POC Glucose 281 (H) 70 - 110   Lactic acid, plasma #2    Collection Time: 08/16/19  9:56 PM   Result Value Ref Range    Lactate (Lactic Acid) 1.6 0.5 - 1.9 mmol/L   TSH    Collection Time: 08/16/19  9:56 PM   Result Value Ref Range    TSH 1.860 0.340 - 5.600 uIU/mL   Troponin I    Collection Time: 08/16/19  9:56 PM   Result Value Ref Range    Troponin I 0.099 (HH) 0.020 - 0.040 ng/mL   Hemoglobin A1c    Collection Time: 08/16/19  9:56 PM   Result Value Ref Range    Hemoglobin A1C 8.3 (H) 4.5 - 6.2 %    Estimated Avg Glucose 192 (H) 68 - 131 mg/dL   Troponin I    Collection Time: 08/17/19 12:47 AM   Result Value Ref Range    Troponin I 0.126 (HH) 0.020 - 0.040 ng/mL   CBC Without Differential    Collection Time: 08/17/19  4:32 AM   Result Value Ref Range    WBC 8.94 3.90 -  12.70 K/uL    RBC 3.35 (L) 4.00 - 5.40 M/uL    Hemoglobin 9.9 (L) 12.0 - 16.0 g/dL    Hematocrit 33.3 (L) 37.0 - 48.5 %    Mean Corpuscular Volume 99 (H) 82 - 98 fL    Mean Corpuscular Hemoglobin 29.6 27.0 - 31.0 pg    Mean Corpuscular Hemoglobin Conc 29.7 (L) 32.0 - 36.0 g/dL    RDW 17.9 (H) 11.5 - 14.5 %    Platelets 130 (L) 150 - 350 K/uL    MPV 10.4 9.2 - 12.9 fL   Basic metabolic panel    Collection Time: 08/17/19  4:32 AM   Result Value Ref Range    Sodium 135 (L) 136 - 145 mmol/L    Potassium 3.4 (L) 3.5 - 5.1 mmol/L    Chloride 96 95 - 110 mmol/L    CO2 25 23 - 29 mmol/L    Glucose 260 (H) 70 - 110 mg/dL    BUN, Bld 54 (H) 8 - 23 mg/dL    Creatinine 1.1 0.5 - 1.4 mg/dL    Calcium 8.7 8.7 - 10.5 mg/dL    Anion Gap 14 8 - 16 mmol/L    eGFR if African American 52.5 (A) >60 mL/min/1.73 m^2    eGFR if non African American 45.6 (A) >60 mL/min/1.73 m^2   Magnesium    Collection Time: 08/17/19  4:32 AM   Result Value Ref Range    Magnesium 1.7 1.6 - 2.6 mg/dL   Phosphorus    Collection Time: 08/17/19  4:32 AM   Result Value Ref Range    Phosphorus 4.0 2.7 - 4.5 mg/dL   POCT glucose    Collection Time: 08/17/19  5:58 AM   Result Value Ref Range    POC Glucose 282 (H) 70 - 110     Imaging Results          X-Ray Chest AP Portable (Final result)  Result time 08/16/19 17:44:51    Final result by Floyd Wells MD (08/16/19 17:44:51)                 Impression:      Left lung base pleuroparenchymal opacity could reflect airspace disease, atelectasis, pleural effusion, or some combination thereof.  Follow-up PA and lateral chest radiography may be helpful.      Electronically signed by: Floyd Wells MD  Date:    08/16/2019  Time:    17:44             Narrative:    EXAMINATION:  XR CHEST AP PORTABLE    CLINICAL HISTORY:  Sepsis;    COMPARISON:  06/05/2019    FINDINGS:  Cardiac silhouette size is enlarged.  Atherosclerotic calcification of the aorta.  Left subclavian pacing leads are in stable position.  There is  pleuroparenchymal opacity at the left lung base.  Right lung is clear.  No pneumothorax.  No acute osseous abnormality.                                12-lead EKG interpretation:  (if applicable)      Current Cardiac Rhythm:   (if applicable)    Physical Exam:   Physical Exam   Constitutional: She is oriented to person, place, and time. She appears well-developed and well-nourished.   HENT:   Head: Normocephalic and atraumatic.   Cardiovascular: An irregularly irregular rhythm present. Tachycardia present.   Pulmonary/Chest: Effort normal. She has wheezes. She has rales. She exhibits tenderness.   Abdominal: Soft.   Musculoskeletal: She exhibits edema.   Neurological: She is alert and oriented to person, place, and time.   Skin: Skin is warm and dry. Capillary refill takes less than 2 seconds.        ASSESSMENT/PLAN:   Assessment:   1. Urosepsis  2. Hypotension   3. Afib with rvr on Eliquis/Sotalol/Toprol   4. DM   5. Polymyalgia rheumatica   6. Encephalopathy   7. Moderate AS GIOVANNA 1.3 mean gradient 22 mmHg     Plan:   Have pacemaker interrogated. Not mode switching properly Biotronik   Pt has CLS in use and likely does not need either due to she is not ambulatory .  Continue Amio IV and likely needs to switch sotalol to amio oral   Continue eliquis

## 2019-08-17 NOTE — ASSESSMENT & PLAN NOTE
History of recurrent UTIs, has chronic indwelling graves catheter changed by home health nurse once per month.    Prior urine cultures with Pseudomonas, Proteus, Klebsiella.  Urine is foul smelling.  - graves catheter was changed in the ED   - continue iv meropenem  - should consider chronic antibiotic suppressant therapy

## 2019-08-17 NOTE — ASSESSMENT & PLAN NOTE
As per collateral yesterday it was increasingly confused with hallucinations.  Much improved today.  Likely secondary to UTI.  Almost back to baseline mental status

## 2019-08-17 NOTE — NURSING
Consult placed for wound care; pt has multiple open areas as described in skin section(LDA). Obtained pictures of each site, then cleaned areas, Applied dressings and cream, as documented.

## 2019-08-17 NOTE — NURSING
Spoke with Dr. Barnett regarding low BPs 70-90s/40-50s and generalized edema. Stated he would discontinue sotalol/metoprolol orders. Stated he would order low rate of continuous fluid. Will continue to monitor.

## 2019-08-17 NOTE — ASSESSMENT & PLAN NOTE
Was  in atrial fibrillation with RVR when pt came to ER, previously was hypotensive but improved status post 1 L IV fluid.    Started on amiodarone bolus/loading protocol in the ED.  On Eliquis for anticoagulation.  Rate now controlled

## 2019-08-17 NOTE — NURSING
Notified MD regarding BP 88/46 and HR still in the 140's with Amiodarone drip going at 33.33ml/hr. MD verbalizes understanding-states to finish the 1L of LR (at 130ml/hr) that was ordered(but never completed infusing) in the ED, also to administer metoprolol and sotalol as ordered in MAR and to notify MD if VS worsen. No new orders obtained. Will carry these out and will continue to monitor.

## 2019-08-17 NOTE — ASSESSMENT & PLAN NOTE
Recent hospitalization for decompensated heart failure, EF of 60%.  She has chronic lower extremity swelling/3rd spacing.  Stable issue now

## 2019-08-18 PROBLEM — R60.1 ANASARCA: Status: ACTIVE | Noted: 2019-08-18

## 2019-08-18 PROBLEM — N17.9 AKI (ACUTE KIDNEY INJURY): Status: ACTIVE | Noted: 2019-08-18

## 2019-08-18 LAB
ALBUMIN SERPL BCP-MCNC: 2.2 G/DL (ref 3.5–5.2)
ALP SERPL-CCNC: 60 U/L (ref 55–135)
ALT SERPL W/O P-5'-P-CCNC: 39 U/L (ref 10–44)
ANION GAP SERPL CALC-SCNC: 16 MMOL/L (ref 8–16)
AST SERPL-CCNC: 50 U/L (ref 10–40)
BASOPHILS # BLD AUTO: 0.02 K/UL (ref 0–0.2)
BASOPHILS NFR BLD: 0.3 % (ref 0–1.9)
BILIRUB SERPL-MCNC: 1 MG/DL (ref 0.1–1)
BUN SERPL-MCNC: 63 MG/DL (ref 8–23)
CALCIUM SERPL-MCNC: 8.1 MG/DL (ref 8.7–10.5)
CHLORIDE SERPL-SCNC: 91 MMOL/L (ref 95–110)
CO2 SERPL-SCNC: 22 MMOL/L (ref 23–29)
CREAT SERPL-MCNC: 1.7 MG/DL (ref 0.5–1.4)
DIFFERENTIAL METHOD: ABNORMAL
EOSINOPHIL # BLD AUTO: 0.2 K/UL (ref 0–0.5)
EOSINOPHIL NFR BLD: 2.4 % (ref 0–8)
ERYTHROCYTE [DISTWIDTH] IN BLOOD BY AUTOMATED COUNT: 17.5 % (ref 11.5–14.5)
EST. GFR  (AFRICAN AMERICAN): 31 ML/MIN/1.73 M^2
EST. GFR  (NON AFRICAN AMERICAN): 26.9 ML/MIN/1.73 M^2
GLUCOSE SERPL-MCNC: 142 MG/DL (ref 70–110)
GLUCOSE SERPL-MCNC: 163 MG/DL (ref 70–110)
GLUCOSE SERPL-MCNC: 170 MG/DL (ref 70–110)
GLUCOSE SERPL-MCNC: 192 MG/DL (ref 70–110)
GLUCOSE SERPL-MCNC: 193 MG/DL (ref 70–110)
HCT VFR BLD AUTO: 28.5 % (ref 37–48.5)
HGB BLD-MCNC: 8.9 G/DL (ref 12–16)
IMM GRANULOCYTES # BLD AUTO: 0.19 K/UL (ref 0–0.04)
IMM GRANULOCYTES NFR BLD AUTO: 2.5 % (ref 0–0.5)
LYMPHOCYTES # BLD AUTO: 1.1 K/UL (ref 1–4.8)
LYMPHOCYTES NFR BLD: 14.6 % (ref 18–48)
MAGNESIUM SERPL-MCNC: 1.5 MG/DL (ref 1.6–2.6)
MCH RBC QN AUTO: 30 PG (ref 27–31)
MCHC RBC AUTO-ENTMCNC: 31.2 G/DL (ref 32–36)
MCV RBC AUTO: 96 FL (ref 82–98)
MONOCYTES # BLD AUTO: 0.3 K/UL (ref 0.3–1)
MONOCYTES NFR BLD: 4.3 % (ref 4–15)
NEUTROPHILS # BLD AUTO: 5.8 K/UL (ref 1.8–7.7)
NEUTROPHILS NFR BLD: 75.9 % (ref 38–73)
NRBC BLD-RTO: 0 /100 WBC
PLATELET # BLD AUTO: 141 K/UL (ref 150–350)
PMV BLD AUTO: 10.8 FL (ref 9.2–12.9)
POTASSIUM SERPL-SCNC: 4.3 MMOL/L (ref 3.5–5.1)
PROT SERPL-MCNC: 5.2 G/DL (ref 6–8.4)
RBC # BLD AUTO: 2.97 M/UL (ref 4–5.4)
SODIUM SERPL-SCNC: 129 MMOL/L (ref 136–145)
WBC # BLD AUTO: 7.61 K/UL (ref 3.9–12.7)

## 2019-08-18 PROCEDURE — 21400001 HC TELEMETRY ROOM

## 2019-08-18 PROCEDURE — 94761 N-INVAS EAR/PLS OXIMETRY MLT: CPT

## 2019-08-18 PROCEDURE — 85025 COMPLETE CBC W/AUTO DIFF WBC: CPT

## 2019-08-18 PROCEDURE — 25000003 PHARM REV CODE 250: Performed by: INTERNAL MEDICINE

## 2019-08-18 PROCEDURE — 36415 COLL VENOUS BLD VENIPUNCTURE: CPT

## 2019-08-18 PROCEDURE — 83735 ASSAY OF MAGNESIUM: CPT

## 2019-08-18 PROCEDURE — 63600175 PHARM REV CODE 636 W HCPCS: Performed by: INTERNAL MEDICINE

## 2019-08-18 PROCEDURE — 80053 COMPREHEN METABOLIC PANEL: CPT

## 2019-08-18 PROCEDURE — 21000000 HC CCU ICU ROOM CHARGE

## 2019-08-18 PROCEDURE — 82962 GLUCOSE BLOOD TEST: CPT

## 2019-08-18 RX ORDER — FUROSEMIDE 10 MG/ML
20 INJECTION INTRAMUSCULAR; INTRAVENOUS
Status: DISCONTINUED | OUTPATIENT
Start: 2019-08-18 | End: 2019-08-19

## 2019-08-18 RX ADMIN — APIXABAN 5 MG: 5 TABLET, FILM COATED ORAL at 08:08

## 2019-08-18 RX ADMIN — MEROPENEM 500 MG: 500 INJECTION, POWDER, FOR SOLUTION INTRAVENOUS at 05:08

## 2019-08-18 RX ADMIN — PREDNISONE 2 MG: 1 TABLET ORAL at 04:08

## 2019-08-18 RX ADMIN — PANTOPRAZOLE SODIUM 40 MG: 40 TABLET, DELAYED RELEASE ORAL at 05:08

## 2019-08-18 RX ADMIN — PREDNISONE 2 MG: 1 TABLET ORAL at 10:08

## 2019-08-18 RX ADMIN — GABAPENTIN 100 MG: 100 CAPSULE ORAL at 08:08

## 2019-08-18 RX ADMIN — MAGNESIUM OXIDE 800 MG: 400 TABLET ORAL at 02:08

## 2019-08-18 RX ADMIN — FUROSEMIDE 20 MG: 10 INJECTION, SOLUTION INTRAMUSCULAR; INTRAVENOUS at 02:08

## 2019-08-18 RX ADMIN — CLOTRIMAZOLE AND BETAMETHASONE DIPROPIONATE 1 G: 10; .5 CREAM TOPICAL at 08:08

## 2019-08-18 RX ADMIN — LEVOTHYROXINE SODIUM 150 MCG: 0.03 TABLET ORAL at 05:08

## 2019-08-18 RX ADMIN — GABAPENTIN 100 MG: 100 CAPSULE ORAL at 02:08

## 2019-08-18 RX ADMIN — POLYETHYLENE GLYCOL 3350 17 G: 17 POWDER, FOR SOLUTION ORAL at 08:08

## 2019-08-18 RX ADMIN — PREDNISONE 2 MG: 1 TABLET ORAL at 08:08

## 2019-08-18 RX ADMIN — ASPIRIN 81 MG 81 MG: 81 TABLET ORAL at 08:08

## 2019-08-18 RX ADMIN — SERTRALINE HYDROCHLORIDE 50 MG: 50 TABLET ORAL at 08:08

## 2019-08-18 NOTE — SUBJECTIVE & OBJECTIVE
Interval History:   Review of Systems   Constitutional: Negative for activity change and appetite change.   HENT: Negative for congestion and dental problem.    Eyes: Negative for discharge and itching.   Respiratory: Negative for shortness of breath.    Cardiovascular: Negative for chest pain.   Gastrointestinal: Negative for abdominal distention and abdominal pain.   Endocrine: Negative for cold intolerance.   Genitourinary: Negative for difficulty urinating and dysuria.   Musculoskeletal: Negative for arthralgias and back pain.   Skin: Negative for color change.   Neurological: Negative for dizziness and facial asymmetry.   Hematological: Negative for adenopathy.   Psychiatric/Behavioral: Negative for agitation and behavioral problems.     Objective:     Vital Signs (Most Recent):  Temp: 97.6 °F (36.4 °C) (08/18/19 1500)  Pulse: 62 (08/18/19 1500)  Resp: 20 (08/18/19 1500)  BP: (!) 97/44 (08/18/19 1500)  SpO2: (!) 94 % (08/18/19 1500) Vital Signs (24h Range):  Temp:  [97.6 °F (36.4 °C)-98.3 °F (36.8 °C)] 97.6 °F (36.4 °C)  Pulse:  [60-87] 62  Resp:  [17-26] 20  SpO2:  [92 %-96 %] 94 %  BP: ()/(43-74) 97/44     Weight: 86.1 kg (189 lb 13.1 oz)  Body mass index is 35.87 kg/m².    Intake/Output Summary (Last 24 hours) at 8/18/2019 1608  Last data filed at 8/18/2019 0600  Gross per 24 hour   Intake 358.65 ml   Output 150 ml   Net 208.65 ml      Physical Exam   Constitutional: She is oriented to person, place, and time. No distress.   Eyes: Pupils are equal, round, and reactive to light. EOM are normal.   Neck: Neck supple.   Cardiovascular: Normal rate.   anasarca   Pulmonary/Chest: Effort normal and breath sounds normal.   Abdominal: Soft. Bowel sounds are normal.   Musculoskeletal: Normal range of motion. She exhibits no edema.   Neurological: She is alert and oriented to person, place, and time.   Skin: Skin is warm.   Psychiatric: She has a normal mood and affect.   Nursing note and vitals  reviewed.      Significant Labs:   CBC:   Recent Labs   Lab 08/16/19  1653 08/17/19  0432 08/18/19  0359   WBC 7.08 8.94 7.61   HGB 9.8* 9.9* 8.9*   HCT 32.0* 33.3* 28.5*   * 130* 141*     CMP:   Recent Labs   Lab 08/16/19  1653 08/17/19  0432 08/18/19  0359    135* 129*   K 3.5 3.4* 4.3   CL 96 96 91*   CO2 29 25 22*   * 260* 193*   BUN 53* 54* 63*   CREATININE 1.0 1.1 1.7*   CALCIUM 8.6* 8.7 8.1*   PROT 6.1  --  5.2*   ALBUMIN 2.5*  --  2.2*   BILITOT 1.0  --  1.0   ALKPHOS 66  --  60   AST 37  --  50*   ALT 24  --  39   ANIONGAP 12 14 16   EGFRNONAA 51.1* 45.6* 26.9*       Significant Imaging: I have reviewed all pertinent imaging results/findings within the past 24 hours.

## 2019-08-18 NOTE — PLAN OF CARE
08/18/19 1612   Post-Acute Status   Post-Acute Authorization Home Health/Hospice   order put in to consult for home health. Patient has [facility].Spoke with patient about Freedom of Choice Form, explained to patient and family that they have the right to choose any agency, and a list of agencies was provided to patient and family to review, they verbalized an understanding, pt signed form, and form scanned into CM notes. Will resume home health at discharge but not sure when patient will discharge yet.

## 2019-08-18 NOTE — ASSESSMENT & PLAN NOTE
Recent hospitalization for decompensated heart failure, EF of 60%.  She has chronic lower extremity swelling/3rd spacing.  Will start pt on iv lasix today

## 2019-08-18 NOTE — ASSESSMENT & PLAN NOTE
History of recurrent UTIs, has chronic indwelling graves catheter changed by home health nurse once per month.    Prior urine cultures with Pseudomonas, Proteus, Klebsiella.  Port of entry for gram negative sepsis from here

## 2019-08-18 NOTE — PLAN OF CARE
Problem: Adult Inpatient Plan of Care  Goal: Plan of Care Review  POC: Discussed with patient and daughter.  LOC: Alert and response to voice and follows commands.   SKIN: Warm and dry. Wound to lower rt leg and lf sacrum area. Some skin irritation in peroneum area from incontinence of stools.    RESPIRATORY: Airway is open and patent. Bilateral chest rise and fall. Respirations are spontaneous, even and unlabored. Normal effort and rate noted. No accessory muscle use noted.   CARDIAC: A-fib with a controlled rate.   ABDOMEN: Soft and non tender to palpation. No distention noted.   URINARY: Indwelling graves catheter, rgaves care given.  Patient is free of fall/trauma/injury. Denies CP, SOB, or pain/discomfort. All questions addressed. Will continue to monitor.

## 2019-08-18 NOTE — PLAN OF CARE
08/17/19 2023   PRE-TX-O2   O2 Device (Oxygen Therapy) room air   SpO2 95 %   Pulse Oximetry Type Continuous   Pulse 87   Resp (!) 26

## 2019-08-18 NOTE — ASSESSMENT & PLAN NOTE
Likely in the setting of infection/Sepsis and atrial fibrillation with RVR.    Patient has chronic debility and bed-bound at baseline.

## 2019-08-18 NOTE — PROGRESS NOTES
[facility]    Cardiology Progress Note    Subjective:  Patient is comfortable in sinus rhythm.  No shortness of breath or chest pains afebrile      Objective:  Vital Signs (Most Recent)  Temp: 97.7 °F (36.5 °C) (08/18/19 0701)  Pulse: 61 (08/18/19 0701)  Resp: (!) 26 (08/18/19 0701)  BP: (!) 92/43 (08/18/19 0701)  SpO2: (!) 94 % (08/18/19 0701)    Vital Signs Range (Last 24H):  Temp:  [97.6 °F (36.4 °C)-98.3 °F (36.8 °C)]   Pulse:  [60-94]   Resp:  [17-30]   BP: ()/(43-74)   SpO2:  [92 %-96 %]     I & O (Last 24H):    Intake/Output Summary (Last 24 hours) at 8/18/2019 0946  Last data filed at 8/18/2019 0600  Gross per 24 hour   Intake 358.65 ml   Output 150 ml   Net 208.65 ml       Current Diet:     Current Diet Order   Procedures    Diet diabetic [facility]; 1800 Calorie; Standard Tray     Order Specific Question:   Indicate patient location for additional diet options:     Answer:   [facility]     Order Specific Question:   Total calories:     Answer:   1800 Calorie     Order Specific Question:   Tray type:     Answer:   Standard Tray        Allergies:  Review of patient's allergies indicates:  No Known Allergies    Meds:  Scheduled Meds:   apixaban  5 mg Oral BID    aspirin  81 mg Oral QAM    clotrimazole-betamethasone 1-0.05%  1 g Topical (Top) BID    gabapentin  100 mg Oral TID    levothyroxine  150 mcg Oral QAM    meropenem (MERREM) IVPB  500 mg Intravenous Q12H    pantoprazole  40 mg Oral Daily    polyethylene glycol  17 g Oral Daily    predniSONE  2 mg Oral TID    sertraline  50 mg Oral QAM     Continuous Infusions:   sodium chloride 0.9% 50 mL/hr at 08/17/19 1722    amiodarone in dextrose 5% Stopped (08/17/19 1930)     PRN Meds:acetaminophen, bisacodyl, dextrose 50%, dextrose 50%, glucagon (human recombinant), glucose, glucose, insulin aspart U-100, magnesium oxide, magnesium oxide, ondansetron, potassium chloride 10%, potassium chloride 10%, potassium chloride 10%, potassium, sodium  phosphates, potassium, sodium phosphates, potassium, sodium phosphates, sodium chloride 0.9%, traMADol, trazodone    Lab Results :  Recent Results (from the past 24 hour(s))   POCT glucose    Collection Time: 08/17/19 11:11 AM   Result Value Ref Range    POC Glucose 243 (H) 70 - 110   POCT glucose    Collection Time: 08/17/19  3:35 PM   Result Value Ref Range    POC Glucose 221 (H) 70 - 110   POCT glucose    Collection Time: 08/17/19  8:59 PM   Result Value Ref Range    POC Glucose 195 (H) 70 - 110   CBC auto differential    Collection Time: 08/18/19  3:59 AM   Result Value Ref Range    WBC 7.61 3.90 - 12.70 K/uL    RBC 2.97 (L) 4.00 - 5.40 M/uL    Hemoglobin 8.9 (L) 12.0 - 16.0 g/dL    Hematocrit 28.5 (L) 37.0 - 48.5 %    Mean Corpuscular Volume 96 82 - 98 fL    Mean Corpuscular Hemoglobin 30.0 27.0 - 31.0 pg    Mean Corpuscular Hemoglobin Conc 31.2 (L) 32.0 - 36.0 g/dL    RDW 17.5 (H) 11.5 - 14.5 %    Platelets 141 (L) 150 - 350 K/uL    MPV 10.8 9.2 - 12.9 fL    Immature Granulocytes 2.5 (H) 0.0 - 0.5 %    Gran # (ANC) 5.8 1.8 - 7.7 K/uL    Immature Grans (Abs) 0.19 (H) 0.00 - 0.04 K/uL    Lymph # 1.1 1.0 - 4.8 K/uL    Mono # 0.3 0.3 - 1.0 K/uL    Eos # 0.2 0.0 - 0.5 K/uL    Baso # 0.02 0.00 - 0.20 K/uL    nRBC 0 0 /100 WBC    Gran% 75.9 (H) 38.0 - 73.0 %    Lymph% 14.6 (L) 18.0 - 48.0 %    Mono% 4.3 4.0 - 15.0 %    Eosinophil% 2.4 0.0 - 8.0 %    Basophil% 0.3 0.0 - 1.9 %    Differential Method Automated    Comprehensive metabolic panel    Collection Time: 08/18/19  3:59 AM   Result Value Ref Range    Sodium 129 (L) 136 - 145 mmol/L    Potassium 4.3 3.5 - 5.1 mmol/L    Chloride 91 (L) 95 - 110 mmol/L    CO2 22 (L) 23 - 29 mmol/L    Glucose 193 (H) 70 - 110 mg/dL    BUN, Bld 63 (H) 8 - 23 mg/dL    Creatinine 1.7 (H) 0.5 - 1.4 mg/dL    Calcium 8.1 (L) 8.7 - 10.5 mg/dL    Total Protein 5.2 (L) 6.0 - 8.4 g/dL    Albumin 2.2 (L) 3.5 - 5.2 g/dL    Total Bilirubin 1.0 0.1 - 1.0 mg/dL    Alkaline Phosphatase 60 55 - 135  "U/L    AST 50 (H) 10 - 40 U/L    ALT 39 10 - 44 U/L    Anion Gap 16 8 - 16 mmol/L    eGFR if African American 31.0 (A) >60 mL/min/1.73 m^2    eGFR if non  26.9 (A) >60 mL/min/1.73 m^2   Magnesium    Collection Time: 08/18/19  3:59 AM   Result Value Ref Range    Magnesium 1.5 (L) 1.6 - 2.6 mg/dL   POCT glucose    Collection Time: 08/18/19  5:49 AM   Result Value Ref Range    POC Glucose 192 (H) 70 - 110       Diagnostic Results:  Imaging Results          X-Ray Chest AP Portable (Final result)  Result time 08/16/19 17:44:51    Final result by Floyd Wells MD (08/16/19 17:44:51)                 Impression:      Left lung base pleuroparenchymal opacity could reflect airspace disease, atelectasis, pleural effusion, or some combination thereof.  Follow-up PA and lateral chest radiography may be helpful.      Electronically signed by: Floyd Wells MD  Date:    08/16/2019  Time:    17:44             Narrative:    EXAMINATION:  XR CHEST AP PORTABLE    CLINICAL HISTORY:  Sepsis;    COMPARISON:  06/05/2019    FINDINGS:  Cardiac silhouette size is enlarged.  Atherosclerotic calcification of the aorta.  Left subclavian pacing leads are in stable position.  There is pleuroparenchymal opacity at the left lung base.  Right lung is clear.  No pneumothorax.  No acute osseous abnormality.                                Recent Cardiac Rhythm   (if applicable)      Physical Exam:  Objective:  General Appearance:  Comfortable.    Vital signs: (most recent): Blood pressure (!) 92/43, pulse 61, temperature 97.7 °F (36.5 °C), resp. rate (!) 26, height 5' 1" (1.549 m), weight 86.1 kg (189 lb 13.1 oz), SpO2 (!) 94 %, not currently breastfeeding.    Lungs:  Normal effort and normal respiratory rate.  Breath sounds clear to auscultation.    Heart: Normal rate.  Regular rhythm.  S1 normal and S2 normal.  No gallop.   Abdomen: Abdomen is soft.  Bowel sounds are normal.     Extremities: There is no local swelling.  "       Current Consults:  IP CONSULT TO HOSPITAL MEDICINE  IP CONSULT TO CARDIOLOGY  WOUND CARE CONSULT  WOUND CARE CONSULT  IP CONSULT TO SOCIAL WORK/CASE MANAGEMENT    Assessment/Plan:  Assessment:   1. Urosepsis  2. Hypotension   3. Afib with rvr on Eliquis/Sotalol/Toprol --now in sinus  4. DM   5. Polymyalgia rheumatica   6. Encephalopathy   7. Moderate AS GIOVANNA 1.3 mean gradient 22 mmHg   Plan:  Continue present medical therapy.  Patient currently in sinus no further cardiac workup is required

## 2019-08-18 NOTE — SIGNIFICANT EVENT
Results for TERRIE LOVE (MRN [id number]) as of 8/18/2019 00:42   Ref. Range 8/17/2019 04:32   Potassium Latest Ref Range: 3.5 - 5.1 mmol/L 3.4 (L)   Results for TERRIE LOVE (MRN [id number]) as of 8/18/2019 00:42   Ref. Range 8/17/2019 04:32   Magnesium Latest Ref Range: 1.6 - 2.6 mg/dL 1.7   Mag and Potassium replaced per PRN orders on MAR.

## 2019-08-18 NOTE — PROGRESS NOTES
[facility] Medicine  Progress Note    Patient Name: Vee Pizano  MRN: [id number]  Patient Class: IP- Inpatient   Admission Date: 8/16/2019  Length of Stay: 2 days  Attending Physician: Urbano Barnett MD  Primary Care Provider: Noah Joseph MD        Subjective:     Principal Problem:Sepsis        HPI:  Mrs. Pizano is a 86-year-old female who presented to the ED via EMS with chief complaint of generalized weakness and foul-smelling urine.  Collateral was obtained from daughters who were present at bedside.  Reported yesterday patient with increased somnolence and minimal oral intake associated with increased confusion and generalized weakness.  Daughters state these symptoms usually occur when patient has urinary tract infection, has a history of recurrent UTIs.  Patient has chronic indwelling graves catheter, changed once monthly by home health nurse, unsure date of most recent change.  Low-grade fever at home at 99 as per home health nurse.  On EMS arrival blood pressure was low 80/40 with heart rates up to the 150s.  In the ED patient with atrial fibrillation with rapid ventricular response, received 1 L LR with improvement in blood pressure. She was started on IV amiodarone bolus and infusion as per protocol.  Graves was changed and UA/urine culture was sent in the ED.  She received IV meropenem for empiric coverage as well as 2 g IV magnesium as was noted to have frequent PVCs on telemetry. On my review patient reports generalized weakness and shortness of breath, 's, .    Overview/Hospital Course:  Patient much better  Patient's mental status is back to baseline  Blood culture growing Gram negative rods  Atrial fibrillation with RVR is now rate controlled  She denies any cough any cough or shortness of breath    Interval History:   Review of Systems   Constitutional: Negative for activity change and appetite change.   HENT: Negative for congestion and dental problem.     Eyes: Negative for discharge and itching.   Respiratory: Negative for shortness of breath.    Cardiovascular: Negative for chest pain.   Gastrointestinal: Negative for abdominal distention and abdominal pain.   Endocrine: Negative for cold intolerance.   Genitourinary: Negative for difficulty urinating and dysuria.   Musculoskeletal: Negative for arthralgias and back pain.   Skin: Negative for color change.   Neurological: Negative for dizziness and facial asymmetry.   Hematological: Negative for adenopathy.   Psychiatric/Behavioral: Negative for agitation and behavioral problems.     Objective:     Vital Signs (Most Recent):  Temp: 97.6 °F (36.4 °C) (08/18/19 1500)  Pulse: 62 (08/18/19 1500)  Resp: 20 (08/18/19 1500)  BP: (!) 97/44 (08/18/19 1500)  SpO2: (!) 94 % (08/18/19 1500) Vital Signs (24h Range):  Temp:  [97.6 °F (36.4 °C)-98.3 °F (36.8 °C)] 97.6 °F (36.4 °C)  Pulse:  [60-87] 62  Resp:  [17-26] 20  SpO2:  [92 %-96 %] 94 %  BP: ()/(43-74) 97/44     Weight: 86.1 kg (189 lb 13.1 oz)  Body mass index is 35.87 kg/m².    Intake/Output Summary (Last 24 hours) at 8/18/2019 1608  Last data filed at 8/18/2019 0600  Gross per 24 hour   Intake 358.65 ml   Output 150 ml   Net 208.65 ml      Physical Exam   Constitutional: She is oriented to person, place, and time. No distress.   Eyes: Pupils are equal, round, and reactive to light. EOM are normal.   Neck: Neck supple.   Cardiovascular: Normal rate.   anasarca   Pulmonary/Chest: Effort normal and breath sounds normal.   Abdominal: Soft. Bowel sounds are normal.   Musculoskeletal: Normal range of motion. She exhibits no edema.   Neurological: She is alert and oriented to person, place, and time.   Skin: Skin is warm.   Psychiatric: She has a normal mood and affect.   Nursing note and vitals reviewed.      Significant Labs:   CBC:   Recent Labs   Lab 08/16/19  1653 08/17/19  0432 08/18/19  0359   WBC 7.08 8.94 7.61   HGB 9.8* 9.9* 8.9*   HCT 32.0* 33.3* 28.5*   PLT  138* 130* 141*     CMP:   Recent Labs   Lab 08/16/19  1653 08/17/19  0432 08/18/19  0359    135* 129*   K 3.5 3.4* 4.3   CL 96 96 91*   CO2 29 25 22*   * 260* 193*   BUN 53* 54* 63*   CREATININE 1.0 1.1 1.7*   CALCIUM 8.6* 8.7 8.1*   PROT 6.1  --  5.2*   ALBUMIN 2.5*  --  2.2*   BILITOT 1.0  --  1.0   ALKPHOS 66  --  60   AST 37  --  50*   ALT 24  --  39   ANIONGAP 12 14 16   EGFRNONAA 51.1* 45.6* 26.9*       Significant Imaging: I have reviewed all pertinent imaging results/findings within the past 24 hours.      Assessment/Plan:      * Sepsis  Gram negative sepsis  On iv meropenem  Will consult ID MD Ochoa  Start Pt on iv lasix  On PO Torsemide at home      SUJEY (acute kidney injury)  Multifactorial:From Hypervolemia,Hypotension associated SUJEY/ATN      Elevated troponin  Likely secondary to demand with sepsis as well as tachyarrhythmia.  - trend cardiac biomarkers    Polymyalgia rheumatica  Patient on Long term steroids       Anemia        Advanced care planning/counseling discussion  Discussions with patient and 2 daughters present at bedside about code status.  Patient expressed wishes to be do not resuscitate, would want aggressive medical care but no chest compressions, intubation, life support.  Daughters in agreement stating aware low chances of successful resuscitation as well as current poor quality of life.      Wide-complex tachycardia  Review of medical records patient noted to have intermittent wide complex tachycardia during previous admissions.  Likely in the setting of AFib and pacemaker.  Stable issue at the moment    Chronic diastolic heart failure  Recent hospitalization for decompensated heart failure, EF of 60%.  She has chronic lower extremity swelling/3rd spacing.  Will start pt on iv lasix today      Sacral wound  Sacral wound, POA, likely in the setting of chronic bed-bound status.  - wound care consult      Chronic venous stasis with right leg wound  Chronic venous  stasis changes with wound to the right leg in the background of chronic leg edema, receiving local wound care.  - wound care consult      Infectious encephalopathy    Almost back to baseline mental status      Generalized weakness on background of chronic debility  Likely in the setting of infection/Sepsis and atrial fibrillation with RVR.    Patient has chronic debility and bed-bound at baseline.      Hypotension   BP 80/40 with rapid ventricular response upon admission.  Improved status post 1 L IV fluids.  - clinically monitor blood pressure  -Still BP on low normal range        UTI (urinary tract infection)  History of recurrent UTIs, has chronic indwelling graves catheter changed by home health nurse once per month.    Prior urine cultures with Pseudomonas, Proteus, Klebsiella.  Port of entry for gram negative sepsis from here    Atrial fibrillation with RVR  Was  in atrial fibrillation with RVR when pt came to ER  Started on amiodarone with improvement in condition.  On Eliquis for anticoagulation.          VTE Risk Mitigation (From admission, onward)        Ordered     apixaban tablet 5 mg  2 times daily      08/16/19 1806     IP VTE HIGH RISK PATIENT  Once      08/16/19 2056                Urbano Barnett MD  Department of Hospital Medicine   [facility]

## 2019-08-18 NOTE — PLAN OF CARE
08/18/19 1608   Discharge Assessment   Assessment Type Discharge Planning Assessment   Confirmed/corrected address and phone number on facesheet? Yes   Assessment information obtained from? Caregiver   Prior to hospitilization cognitive status: Alert/Oriented   Prior to hospitalization functional status: Completely Dependent   Current cognitive status: Alert/Oriented   Current Functional Status: Completely Dependent   Lives With spouse   Able to Return to Prior Arrangements yes  (Daughters live close by )   Is patient able to care for self after discharge? No   Who are your caregiver(s) and their phone number(s)? Tamera Wells Daughter [phone]   Patient's perception of discharge disposition home health   Patient currently being followed by outpatient case management? No   Patient currently receives any other outside agency services? No   Equipment Currently Used at Home lift device;hospital bed;walker, rolling;wheelchair   Do you have any problems affording any of your prescribed medications? No   Is the patient taking medications as prescribed? yes   Does the patient have transportation home? Yes   Transportation Anticipated family or friend will provide   Discharge Plan A Home Health   Discharge Plan B Home Health   Patient/Family in Agreement with Plan yes

## 2019-08-18 NOTE — ASSESSMENT & PLAN NOTE
Chronic venous stasis changes with wound to the right leg in the background of chronic leg edema, receiving local wound care.  - wound care consult

## 2019-08-18 NOTE — PROGRESS NOTES
Consult Note  Infectious Disease    Reason for Consult:  Gram-negative bacteremia      HPI: Vee Pizano is a  86 y.o. female with past medical history of AFib, PPM,  HTN, DL P, RA on prednisone 1 mg, PMR, frequent UTIs, chronic indwelling Jim catheter,  She was brought to ED with complaints of generalized weakness, increased somnolence, confusion, decreased appetite, and foul-smelling urine.  She was found to be hypotensive, tachypneic, tachycardic, AFib with RVR.  She received IV fluids, IV amiodarone for AFib and meropenem for UTI.  Patient improved fast.  Mentation is back to baseline.  Blood cultures are showing gram-negative rods hence ID consult.    Patient has history of frequent UTI and they always present with mental status changes.  T max was 99.7 on admission and has been 97.6  She denies sinus complaints, sore throat, no cough no phlegm although chest x-ray was not completely normal.  She denied diarrhea but she had loose stools on her on my physical exam.    Review of patient's allergies indicates:  No Known Allergies  Past Medical History:   Diagnosis Date    Chronic atrial fibrillation     Diabetes mellitus     Hyperlipidemia     Hypertension     PMR (polymyalgia rheumatica)     Rheumatoid arthritis     Thyroid disease      Past Surgical History:   Procedure Laterality Date    APPENDECTOMY      BREAST BIOPSY Right     CARDIAC PACEMAKER PLACEMENT      CATARACT EXTRACTION Right     CHOLECYSTECTOMY      HYSTERECTOMY      JOINT REPLACEMENT Left     knee    TONSILLECTOMY       Social History     Socioeconomic History    Marital status:      Spouse name: Not on file    Number of children: Not on file    Years of education: Not on file    Highest education level: Not on file   Occupational History    Not on file   Social Needs    Financial resource strain: Not on file    Food insecurity:     Worry: Not on file     Inability: Not on file    Transportation needs:      Medical: Not on file     Non-medical: Not on file   Tobacco Use    Smoking status: Former Smoker     Packs/day: 1.00     Years: 10.00     Pack years: 10.00     Types: Cigarettes     Last attempt to quit: 1967     Years since quittin.6    Smokeless tobacco: Never Used   Substance and Sexual Activity    Alcohol use: No    Drug use: No    Sexual activity: Not on file   Lifestyle    Physical activity:     Days per week: Not on file     Minutes per session: Not on file    Stress: Not on file   Relationships    Social connections:     Talks on phone: Not on file     Gets together: Not on file     Attends [employer] service: Not on file     Active member of club or organization: Not on file     Attends meetings of clubs or organizations: Not on file     Relationship status: Not on file   Other Topics Concern    Not on file   Social History Narrative    Not on file     Family History   Problem Relation Age of Onset    Heart disease Mother     Cancer Father         Lung CA       Pertinent medications noted:     Review of Systems:  Denies all below but I am not sure how accurate her history is.  She knows she has a chronic Jim catheter but does not know the details of how often he get chains at set  No chills, fever, sweats, weight loss  No change in vision, loss of vision or diplopia  No sinus congestion, purulent nasal discharge, post nasal drip or facial pain  No pain in mouth or throat. No problems with teeth, gums.  No chest pain, palpitations, syncope  No cough, sputum production, shortness of breath, dyspnea on exertion, pleurisy, hemoptysis  No nausea, vomiting, diarrhea, constipation, blood in stool, or focal abd pain,  No dysphagia, odynophagia  No dysuria, hematuria, strangury, retention, incontinence, nocturia, prostatism,   No vaginal discharge, genital ulcers, risk for STD  No swelling of joints, redness of joints, injuries, or new focal pain  No unusual headaches, dizziness, vertigo, numbness,  paresthesias, neuropathy, falls  No anxiety, depression, substance abuse, sleep disturbance  No diabetes, thyroid, hypogonadal conditions  No bleeding, lymphadenopathy, anemia, malignancy, unusual bruising  No new rashes, lesions, or wounds  No TB exposure  No recent/prior steroids  Outdoor activities:  Travel:   Implants:   Antibiotic History:     EXAM & DIAGNOSTICS REVIEWED:   Vitals:     Temp:  [97.6 °F (36.4 °C)-98.3 °F (36.8 °C)]   Temp: 97.8 °F (36.6 °C) (08/18/19 1100)  Pulse: 61 (08/18/19 1100)  Resp: (!) 22 (08/18/19 1100)  BP: (!) 91/44 (08/18/19 1100)  SpO2: 95 % (08/18/19 1100)    Intake/Output Summary (Last 24 hours) at 8/18/2019 1502  Last data filed at 8/18/2019 0600  Gross per 24 hour   Intake 358.65 ml   Output 150 ml   Net 208.65 ml       General:  In NAD. Alert and attentive, cooperative, comfortable  Eyes:  Anicteric, PERRL, EOMI  ENT:  No ulcers, exudates, thrush, nares patent,  dentures  Neck:  supple, no masses or adenopathy appreciated  Lungs: Clear, no consolidation, rales, wheezes, rub  Heart:  RRR, no gallop/murmur/rub noted  Abd:  Soft, NT, ND, normal BS, no masses or organomegaly appreciated.  :  Jim, urine clear, no flank tenderness.  Loose stools so on cannot do a probable genital exam at this time  Musc:  Joints without effusion, swelling, erythema, synovitis, muscle wasting.   Skin:  Easy bruising especially on upper extremities due to steroids No palmar or plantar lesions. No subungual petechiae  Wound:   Neuro: Alert, attentive, speech fluent, face symmetric, moves all extremities, no focal weakness.  Generalized weakness.  Cannot keep her legs against gravity   Psych:  Calm, cooperative  Lymphatic:     No cervical, supraclavicular, axillary, or inguinal nodes  Extrem: No edema, erythema, phlebitis, cellulitis, warm and well perfused  VAD:  Isolation:     Lines/Tubes/Drains:    General Labs reviewed:  Recent Labs   Lab 08/18/19  0359   WBC 7.61   RBC 2.97*   HGB 8.9*   HCT  28.5*   *   MCV 96   MCH 30.0   MCHC 31.2*     Recent Labs   Lab 08/18/19  0359   CALCIUM 8.1*   PROT 5.2*   *   K 4.3   CO2 22*   CL 91*   BUN 63*   CREATININE 1.7*   ALKPHOS 60   ALT 39   AST 50*   BILITOT 1.0           Micro:  Microbiology Results (last 7 days)     Procedure Component Value Units Date/Time    Blood culture x two cultures. Draw prior to antibiotics. [[id number]]  (Abnormal) Collected:  08/16/19 1645    Order Status:  Completed Specimen:  Blood from Peripheral, Antecubital, Right Updated:  08/18/19 0839     Blood Culture, Routine Gram stain jakub bottle: Gram negative rods      Results called to and read back by: Yazmin Law RN A-card re:ana guadalupe       08/17/2019  14:54 ca      GRAM NEGATIVE USHA, LACTOSE   Identification and susceptibility pending      Narrative:       Aerobic and anaerobic    Urine culture [[id number]] Collected:  08/16/19 1808    Order Status:  Completed Specimen:  Urine Updated:  08/18/19 0833     Urine Culture, Routine Insufficient incubation, culture in progress      No growth to date    Narrative:       Preferred Collection Type->Urine, Clean Catch  Specimen Source->Urine    Blood culture x two cultures. Draw prior to antibiotics. [[id number]] Collected:  08/16/19 1653    Order Status:  Completed Specimen:  Blood from Peripheral, Antecubital, Left Updated:  08/17/19 1832     Blood Culture, Routine No Growth to date      No Growth to date    Narrative:       Aerobic and anaerobic      ORDER#: [id number] ORDERED BY: CARLOS MARTIN SOURCE: URINE CLEAN CATCH COLLECTED: 05/19/19 19:03 ANTIBIOTICS AT [facility].: RECEIVED : 05/19/19 19:24 CULTURE URINE FINAL 05/22/19 06:30 30,000-50,000 cfu/ml Klebsiella(formerly Enterobacter)aer _____________________________________________________________________________ Organism Kleb(Enterobac)ae Antibiotic THERESA INTRPCOST _____________________________________________________________________________ Amox/K Clav'ate >16/8 R R $  Ceftriaxone <=8 S S $ Cefazolin >16 R R $ Ciprofloxacin <=1 S S $$ Cefuroxime <=4 R R $$ Ertapenem <=2 S S $$$ Nitrofurantoin 64 I $ Tetracycline <=4 S S $ Gentamicin <=4 S S $ Tobramycin <=4 S S $$ Imipenem <=1 S S $$$ Levofloxacin <=2 S S $ Meropenem <=1 S S $$$ Piperacillin/Tazo <=16 S S $$ Trimeth/Sulfa <=2/38 S S $ Ampicillin >16 R R $ _____________________________________________________________________________        Imaging Reviewed:   CXR  Left lung base pleuroparenchymal opacity could reflect airspace disease, atelectasis, pleural effusion, or some combination thereof.  Follow-up PA and lateral chest radiography may be helpful.   CT    Cardiology:  Echocardiogram 03/25/2019Technically difficult examination. Atrial fibrillation. Normal left ventricle structure and function. Ejection fraction is 55-60 %. Mild Mitral annular calcification(MAC). Moderate aortic stenosis. Moderate aortic regurgitation. Mildly elevated pulmonary artery pressure.      IMPRESSION & PLAN     Gram-negative bacteremia  UTI, chronic indwelling Jim catheter, h/o frequent UTIs, most recently due to Klebsiella on 05/19/2019.  On steroids chronically for RA  PPM in situ  Pleural parenchymal opacities on chest x-ray.  No pulmonary complaints.  One episode of loose stools.  Will monitor    PMH of AFib, PPM, HFpEF,  HTN, DL P, RA, PMR, anemia, frequent UTIs, chronic indwelling Jim catheter,    Recommendations:  Continue same, meropenem, as she is improving.    Follow GNR from blood cultures and taper down treatment accordingly  Repeat chest x-ray.  I do not think she has pneumonia because she improved so fast and because she has no pulmonary complaints.  If diarrhea persists will send stool studies.

## 2019-08-18 NOTE — ASSESSMENT & PLAN NOTE
Was  in atrial fibrillation with RVR when pt came to ER  Started on amiodarone with improvement in condition.  On Eliquis for anticoagulation.

## 2019-08-18 NOTE — ASSESSMENT & PLAN NOTE
Review of medical records patient noted to have intermittent wide complex tachycardia during previous admissions.  Likely in the setting of AFib and pacemaker.  Stable issue at the moment

## 2019-08-19 PROBLEM — I48.0 PAROXYSMAL ATRIAL FIBRILLATION WITH RAPID VENTRICULAR RESPONSE: Status: ACTIVE | Noted: 2019-08-19

## 2019-08-19 PROBLEM — B99.9 INFECTIOUS ENCEPHALOPATHY: Status: RESOLVED | Noted: 2019-08-16 | Resolved: 2019-08-19

## 2019-08-19 PROBLEM — G93.49 INFECTIOUS ENCEPHALOPATHY: Status: RESOLVED | Noted: 2019-08-16 | Resolved: 2019-08-19

## 2019-08-19 PROBLEM — R00.0 WIDE-COMPLEX TACHYCARDIA: Chronic | Status: RESOLVED | Noted: 2019-08-16 | Resolved: 2019-08-19

## 2019-08-19 PROBLEM — I95.9 HYPOTENSION: Status: RESOLVED | Noted: 2019-08-16 | Resolved: 2019-08-19

## 2019-08-19 LAB
ALBUMIN SERPL BCP-MCNC: 2.1 G/DL (ref 3.5–5.2)
ALBUMIN SERPL BCP-MCNC: 2.1 G/DL (ref 3.5–5.2)
ALP SERPL-CCNC: 71 U/L (ref 55–135)
ALP SERPL-CCNC: 71 U/L (ref 55–135)
ALT SERPL W/O P-5'-P-CCNC: 31 U/L (ref 10–44)
ALT SERPL W/O P-5'-P-CCNC: 31 U/L (ref 10–44)
ANION GAP SERPL CALC-SCNC: 14 MMOL/L (ref 8–16)
ANION GAP SERPL CALC-SCNC: 14 MMOL/L (ref 8–16)
AST SERPL-CCNC: 27 U/L (ref 10–40)
AST SERPL-CCNC: 27 U/L (ref 10–40)
BACTERIA BLD CULT: ABNORMAL
BACTERIA UR CULT: NO GROWTH
BASOPHILS # BLD AUTO: 0.01 K/UL (ref 0–0.2)
BASOPHILS NFR BLD: 0.1 % (ref 0–1.9)
BILIRUB SERPL-MCNC: 0.6 MG/DL (ref 0.1–1)
BILIRUB SERPL-MCNC: 0.6 MG/DL (ref 0.1–1)
BUN SERPL-MCNC: 62 MG/DL (ref 8–23)
BUN SERPL-MCNC: 62 MG/DL (ref 8–23)
CALCIUM SERPL-MCNC: 8.5 MG/DL (ref 8.7–10.5)
CALCIUM SERPL-MCNC: 8.5 MG/DL (ref 8.7–10.5)
CHLORIDE SERPL-SCNC: 93 MMOL/L (ref 95–110)
CHLORIDE SERPL-SCNC: 93 MMOL/L (ref 95–110)
CO2 SERPL-SCNC: 23 MMOL/L (ref 23–29)
CO2 SERPL-SCNC: 23 MMOL/L (ref 23–29)
CREAT SERPL-MCNC: 2.1 MG/DL (ref 0.5–1.4)
CREAT SERPL-MCNC: 2.1 MG/DL (ref 0.5–1.4)
CRP SERPL-MCNC: 22.38 MG/DL (ref 0–0.75)
DIFFERENTIAL METHOD: ABNORMAL
EOSINOPHIL # BLD AUTO: 0.2 K/UL (ref 0–0.5)
EOSINOPHIL NFR BLD: 2 % (ref 0–8)
ERYTHROCYTE [DISTWIDTH] IN BLOOD BY AUTOMATED COUNT: 17.4 % (ref 11.5–14.5)
ERYTHROCYTE [DISTWIDTH] IN BLOOD BY AUTOMATED COUNT: 17.4 % (ref 11.5–14.5)
EST. GFR  (AFRICAN AMERICAN): 24 ML/MIN/1.73 M^2
EST. GFR  (AFRICAN AMERICAN): 24 ML/MIN/1.73 M^2
EST. GFR  (NON AFRICAN AMERICAN): 20.9 ML/MIN/1.73 M^2
EST. GFR  (NON AFRICAN AMERICAN): 20.9 ML/MIN/1.73 M^2
FERRITIN SERPL-MCNC: 236 NG/ML (ref 20–300)
GLUCOSE SERPL-MCNC: 181 MG/DL (ref 70–110)
GLUCOSE SERPL-MCNC: 203 MG/DL (ref 70–110)
GLUCOSE SERPL-MCNC: 203 MG/DL (ref 70–110)
GLUCOSE SERPL-MCNC: 215 MG/DL (ref 70–110)
GLUCOSE SERPL-MCNC: 221 MG/DL (ref 70–110)
GLUCOSE SERPL-MCNC: 231 MG/DL (ref 70–110)
HCT VFR BLD AUTO: 27.8 % (ref 37–48.5)
HCT VFR BLD AUTO: 27.8 % (ref 37–48.5)
HGB BLD-MCNC: 8.6 G/DL (ref 12–16)
HGB BLD-MCNC: 8.6 G/DL (ref 12–16)
IMM GRANULOCYTES # BLD AUTO: 0.26 K/UL (ref 0–0.04)
IMM GRANULOCYTES NFR BLD AUTO: 3.5 % (ref 0–0.5)
IRON SERPL-MCNC: 29 UG/DL (ref 30–160)
LYMPHOCYTES # BLD AUTO: 0.8 K/UL (ref 1–4.8)
LYMPHOCYTES NFR BLD: 10.4 % (ref 18–48)
MAGNESIUM SERPL-MCNC: 1.8 MG/DL (ref 1.6–2.6)
MCH RBC QN AUTO: 29.5 PG (ref 27–31)
MCH RBC QN AUTO: 29.5 PG (ref 27–31)
MCHC RBC AUTO-ENTMCNC: 30.9 G/DL (ref 32–36)
MCHC RBC AUTO-ENTMCNC: 30.9 G/DL (ref 32–36)
MCV RBC AUTO: 95 FL (ref 82–98)
MCV RBC AUTO: 95 FL (ref 82–98)
MONOCYTES # BLD AUTO: 0.3 K/UL (ref 0.3–1)
MONOCYTES NFR BLD: 3.8 % (ref 4–15)
NEUTROPHILS # BLD AUTO: 5.9 K/UL (ref 1.8–7.7)
NEUTROPHILS NFR BLD: 80.2 % (ref 38–73)
NRBC BLD-RTO: 0 /100 WBC
PLATELET # BLD AUTO: 152 K/UL (ref 150–350)
PLATELET # BLD AUTO: 152 K/UL (ref 150–350)
PMV BLD AUTO: 10.6 FL (ref 9.2–12.9)
PMV BLD AUTO: 10.6 FL (ref 9.2–12.9)
POTASSIUM SERPL-SCNC: 4 MMOL/L (ref 3.5–5.1)
POTASSIUM SERPL-SCNC: 4 MMOL/L (ref 3.5–5.1)
PROCALCITONIN SERPL IA-MCNC: 0.51 NG/ML (ref 0–0.5)
PROT SERPL-MCNC: 5.4 G/DL (ref 6–8.4)
PROT SERPL-MCNC: 5.4 G/DL (ref 6–8.4)
RBC # BLD AUTO: 2.92 M/UL (ref 4–5.4)
RBC # BLD AUTO: 2.92 M/UL (ref 4–5.4)
SATURATED IRON: 15 % (ref 20–50)
SODIUM SERPL-SCNC: 130 MMOL/L (ref 136–145)
SODIUM SERPL-SCNC: 130 MMOL/L (ref 136–145)
SODIUM UR-SCNC: <10 MMOL/L (ref 20–250)
TOTAL IRON BINDING CAPACITY: 195 UG/DL (ref 250–450)
TRANSFERRIN SERPL-MCNC: 139 MG/DL (ref 200–375)
WBC # BLD AUTO: 7.4 K/UL (ref 3.9–12.7)
WBC # BLD AUTO: 7.4 K/UL (ref 3.9–12.7)

## 2019-08-19 PROCEDURE — 83540 ASSAY OF IRON: CPT

## 2019-08-19 PROCEDURE — 80053 COMPREHEN METABOLIC PANEL: CPT

## 2019-08-19 PROCEDURE — 82728 ASSAY OF FERRITIN: CPT

## 2019-08-19 PROCEDURE — 84145 PROCALCITONIN (PCT): CPT

## 2019-08-19 PROCEDURE — 84300 ASSAY OF URINE SODIUM: CPT

## 2019-08-19 PROCEDURE — 85025 COMPLETE CBC W/AUTO DIFF WBC: CPT

## 2019-08-19 PROCEDURE — 86140 C-REACTIVE PROTEIN: CPT

## 2019-08-19 PROCEDURE — 25000003 PHARM REV CODE 250: Performed by: INTERNAL MEDICINE

## 2019-08-19 PROCEDURE — 94761 N-INVAS EAR/PLS OXIMETRY MLT: CPT

## 2019-08-19 PROCEDURE — 87040 BLOOD CULTURE FOR BACTERIA: CPT

## 2019-08-19 PROCEDURE — 25000003 PHARM REV CODE 250: Performed by: PHYSICIAN ASSISTANT

## 2019-08-19 PROCEDURE — 63600175 PHARM REV CODE 636 W HCPCS: Performed by: INTERNAL MEDICINE

## 2019-08-19 PROCEDURE — 36415 COLL VENOUS BLD VENIPUNCTURE: CPT

## 2019-08-19 PROCEDURE — 83735 ASSAY OF MAGNESIUM: CPT

## 2019-08-19 PROCEDURE — 21400001 HC TELEMETRY ROOM

## 2019-08-19 PROCEDURE — 82962 GLUCOSE BLOOD TEST: CPT

## 2019-08-19 RX ORDER — POLYETHYLENE GLYCOL 3350 17 G/17G
17 POWDER, FOR SOLUTION ORAL EVERY OTHER DAY
Status: DISCONTINUED | OUTPATIENT
Start: 2019-08-20 | End: 2019-08-23 | Stop reason: HOSPADM

## 2019-08-19 RX ORDER — SODIUM CHLORIDE 9 MG/ML
INJECTION, SOLUTION INTRAVENOUS CONTINUOUS
Status: DISCONTINUED | OUTPATIENT
Start: 2019-08-19 | End: 2019-08-20

## 2019-08-19 RX ORDER — AMIODARONE HYDROCHLORIDE 200 MG/1
200 TABLET ORAL 2 TIMES DAILY
Status: DISCONTINUED | OUTPATIENT
Start: 2019-08-19 | End: 2019-08-20

## 2019-08-19 RX ADMIN — INSULIN ASPART 2 UNITS: 100 INJECTION, SOLUTION INTRAVENOUS; SUBCUTANEOUS at 04:08

## 2019-08-19 RX ADMIN — MAGNESIUM OXIDE 800 MG: 400 TABLET ORAL at 12:08

## 2019-08-19 RX ADMIN — SERTRALINE HYDROCHLORIDE 50 MG: 50 TABLET ORAL at 06:08

## 2019-08-19 RX ADMIN — CLOTRIMAZOLE AND BETAMETHASONE DIPROPIONATE 1 G: 10; .5 CREAM TOPICAL at 09:08

## 2019-08-19 RX ADMIN — ASPIRIN 81 MG 81 MG: 81 TABLET ORAL at 09:08

## 2019-08-19 RX ADMIN — LACTOBACILLUS TAB 1 TABLET: TAB at 04:08

## 2019-08-19 RX ADMIN — INSULIN ASPART 4 UNITS: 100 INJECTION, SOLUTION INTRAVENOUS; SUBCUTANEOUS at 12:08

## 2019-08-19 RX ADMIN — AMIODARONE HYDROCHLORIDE 200 MG: 200 TABLET ORAL at 09:08

## 2019-08-19 RX ADMIN — APIXABAN 5 MG: 5 TABLET, FILM COATED ORAL at 09:08

## 2019-08-19 RX ADMIN — PANTOPRAZOLE SODIUM 40 MG: 40 TABLET, DELAYED RELEASE ORAL at 06:08

## 2019-08-19 RX ADMIN — FUROSEMIDE 20 MG: 10 INJECTION, SOLUTION INTRAMUSCULAR; INTRAVENOUS at 03:08

## 2019-08-19 RX ADMIN — MEROPENEM 500 MG: 500 INJECTION, POWDER, FOR SOLUTION INTRAVENOUS at 04:08

## 2019-08-19 RX ADMIN — LEVOTHYROXINE SODIUM 150 MCG: 0.03 TABLET ORAL at 06:08

## 2019-08-19 RX ADMIN — CEFTRIAXONE 2 G: 2 INJECTION, SOLUTION INTRAVENOUS at 04:08

## 2019-08-19 RX ADMIN — PREDNISONE 2 MG: 1 TABLET ORAL at 09:08

## 2019-08-19 RX ADMIN — GABAPENTIN 100 MG: 100 CAPSULE ORAL at 09:08

## 2019-08-19 RX ADMIN — INSULIN ASPART 4 UNITS: 100 INJECTION, SOLUTION INTRAVENOUS; SUBCUTANEOUS at 09:08

## 2019-08-19 RX ADMIN — SODIUM CHLORIDE: 0.9 INJECTION, SOLUTION INTRAVENOUS at 12:08

## 2019-08-19 RX ADMIN — GABAPENTIN 100 MG: 100 CAPSULE ORAL at 04:08

## 2019-08-19 NOTE — PROGRESS NOTES
Progress Note  Infectious Disease    Follow-up For:  bacteremia    SUBJECTIVE:   08/18   Vee Pizano is a  86 y.o. female with past medical history of AFib, PPM,  HTN, DL P, RA on prednisone 1 mg, PMR, frequent UTIs, chronic indwelling Jim catheter,  She was brought to ED with complaints of generalized weakness, increased somnolence, confusion, decreased appetite, and foul-smelling urine.  She was found to be hypotensive, tachypneic, tachycardic, AFib with RVR.  She received IV fluids, IV amiodarone for AFib and meropenem for UTI.  Patient improved fast.  Mentation is back to baseline.  Blood cultures are showing gram-negative rods hence ID consult.     Patient has history of frequent UTI and they always present with mental status changes.  T max was 99.7 on admission and has been 97.6  She denies sinus complaints, sore throat, no cough no phlegm although chest x-ray was not completely normal.  She denied diarrhea but she had loose stools on her on my physical exam.    08/19 afebrile; the highest temperature she had during this admission was 99.7.  Blood cultures grew E coli on the 16th. UCx was neg, but UA is having more than 100 wbc.  Diarrhea resolved.  She was on MiraLax   Patient feels back to her baseline.    Antibiotics (From admission, onward)    Start     Stop Route Frequency Ordered    08/17/19 0530  meropenem 500 mg in sodium chloride 0.9 % 100 mL IVPB (ready to mix system)      -- IV Every 12 hours (non-standard times) 08/16/19 2333        Antifungals (From admission, onward)    Start     Stop Route Frequency Ordered    08/16/19 2100  clotrimazole-betamethasone 1-0.05% cream 1 g      -- Top 2 times daily 08/16/19 1806        Antivirals (From admission, onward)    None            EXAM & DIAGNOSTICS REVIEWED:   Vitals:     Temp:  [97.5 °F (36.4 °C)-98 °F (36.7 °C)]   Temp: 97.6 °F (36.4 °C) (08/19/19 0733)  Pulse: 69 (08/19/19 0809)  Resp: 20 (08/19/19 0809)  BP: (!) 121/59 (08/19/19 2817)  SpO2: (!)  92 % (08/19/19 0809)    Intake/Output Summary (Last 24 hours) at 8/19/2019 0907  Last data filed at 8/18/2019 1800  Gross per 24 hour   Intake --   Output 1 ml   Net -1 ml       General:  In NAD. Alert and attentive, cooperative, comfortable obese  Eyes:  Anicteric, PERRL, EOMI  ENT:  No ulcers, exudates, thrush, nares patent, dentition is ok. Facies lunata from steroids  Neck:  supple, no masses or adenopathy appreciated  Lungs:  Clear, no consolidation, rales, wheezes, rub  Heart:  RRR, no gallop/murmur/rub noted  Abd:  Soft, NT, ND, normal BS, no masses or organomegaly appreciated.  :   urine clear, no flank tenderness  Musc:  Joints without effusion, swelling,  erythema, synovitis,muscle wasting.   Skin:  No rashes. No palmar or plantar lesions. No subungual petechiae  Wound:   Neuro:  Alert, attentive, speech fluent, face symmetric, moves all extremities, no focal weakness,  Min  Ambulatory to none  Psych:  Calm, cooperative  Lymphatic:      No cervical, supraclavicular, axillary, or inguinal nodes  Extrem: No edema, erythema, phlebitis, cellulitis, warm and well perfused  VAD:  Isolation:     Lines/Tubes/Drains:    General Labs reviewed:  Recent Labs   Lab 08/19/19 0359   WBC 7.40  7.40   RBC 2.92*  2.92*   HGB 8.6*  8.6*   HCT 27.8*  27.8*     152   MCV 95  95   MCH 29.5  29.5   MCHC 30.9*  30.9*     Recent Labs   Lab 08/19/19 0359   CALCIUM 8.5*  8.5*   PROT 5.4*  5.4*   *  130*   K 4.0  4.0   CO2 23  23   CL 93*  93*   BUN 62*  62*   CREATININE 2.1*  2.1*   ALKPHOS 71  71   ALT 31  31   AST 27  27   BILITOT 0.6  0.6       Micro:   Microbiology Results (last 7 days)     Procedure Component Value Units Date/Time    Urine culture [[id number]] Collected:  08/16/19 1808    Order Status:  Completed Specimen:  Urine Updated:  08/19/19 0624     Urine Culture, Routine No growth    Narrative:       Preferred Collection Type->Urine, Clean Catch  Specimen Source->Urine    Blood  culture x two cultures. Draw prior to antibiotics. [[id number]]  (Abnormal)  (Susceptibility) Collected:  08/16/19 1645    Order Status:  Completed Specimen:  Blood from Peripheral, Antecubital, Right Updated:  08/19/19 0608     Blood Culture, Routine Gram stain jakub bottle: Gram negative rods      Results called to and read back by: Yazmin Law RN A-card re:ana gnr       08/17/2019  14:54 ca      ESCHERICHIA COLI    Narrative:       Aerobic and anaerobic    Blood culture x two cultures. Draw prior to antibiotics. [[id number]] Collected:  08/16/19 1653    Order Status:  Completed Specimen:  Blood from Peripheral, Antecubital, Left Updated:  08/18/19 1832     Blood Culture, Routine No Growth to date      No Growth to date      No Growth to date    Narrative:       Aerobic and anaerobic           Imaging Reviewed:              CXR  Left lung base pleuroparenchymal opacity could reflect airspace disease, atelectasis, pleural effusion, or some combination thereof.  Follow-up PA and lateral chest radiography may be helpful.              CXR 08/19 Persistent elevation of the left hemidiaphragm associated with mild left basilar airspace opacity or volume loss, improved compared to the prior examination.    Cardiology:  Echocardiogram 03/25/2019Technically difficult examination. Atrial fibrillation. Normal left ventricle structure and function. Ejection fraction is 55-60 %. Mild Mitral annular calcification(MAC). Moderate aortic stenosis. Moderate aortic regurgitation. Mildly elevated pulmonary artery pressure.       ASSESSMENT & PLAN        Ecoli  bacteremia, 08/16/2019  UTI,  urine WBC more than 100, chronic indwelling Jim catheter, h/o frequent UTIs, most recently due to Klebsiella on 05/19/2019.  On steroids chronically for RA  L hemidiaphragm elevation, no pulm complaints  PPM in situ  Pleural parenchymal opacities on chest x-ray.  No pulmonary complaints.  One episode of loose stools.  Will monitor. ?  Received  MiraLax?     PMH of AFib, PPM, HFpEF,  HTN, DL P, RA, PMR, anemia, frequent UTIs, chronic indwelling Jim catheter,     Recommendations:  -Change meropenem to ceftriaxone 2 g IV Q 24 hr.   Complete at least 7 days of treatment.  -Another option would be ciprofloxacin 500 p.o. q.day x7 days only if okay with cardiologist as patient already has cardiac history, arrhythmias, ppm.  I discussed with cardiologist.  They will see patient and consider holding amiodarone for a week.  Discussed with hospitalist.    Will sign off

## 2019-08-19 NOTE — PROGRESS NOTES
[facility] Medicine  Progress Note    Patient Name: Vee Pizano  MRN: [id number]  Patient Class: IP- Inpatient   Admission Date: 8/16/2019  Length of Stay: 3 days  Attending Physician: Yancy Castillo MD  Primary Care Provider: Noah Joseph MD        Subjective:     Principal Problem:Sepsis        HPI:  Mrs. Pizano is a 86-year-old female who presented to the ED via EMS with chief complaint of generalized weakness and foul-smelling urine.  Collateral was obtained from daughters who were present at bedside.  Reported yesterday patient with increased somnolence and minimal oral intake associated with increased confusion and generalized weakness.  Daughters state these symptoms usually occur when patient has urinary tract infection, has a history of recurrent UTIs.  Patient has chronic indwelling graves catheter, changed once monthly by home health nurse, unsure date of most recent change.  Low-grade fever at home at 99 as per home health nurse.  On EMS arrival blood pressure was low 80/40 with heart rates up to the 150s.  In the ED patient with atrial fibrillation with rapid ventricular response, received 1 L LR with improvement in blood pressure. She was started on IV amiodarone bolus and infusion as per protocol.  Graves was changed and UA/urine culture was sent in the ED.  She received IV meropenem for empiric coverage as well as 2 g IV magnesium as was noted to have frequent PVCs on telemetry. On my review patient reports generalized weakness and shortness of breath, 's, .    Overview/Hospital Course:  Patient much better  Patient's mental status is back to baseline  Blood culture growing Gram negative rods  Atrial fibrillation with RVR is now rate controlled  She denies any cough any cough or shortness of breath    Interval History: no acute events overnight, tolerating PO diet, but decreased fluid intake    Review of Systems   Constitutional: Negative for chills  and fever.   HENT: Negative for congestion.    Respiratory: Negative for shortness of breath and wheezing.    Cardiovascular: Negative for chest pain and palpitations.   Gastrointestinal: Negative for constipation, diarrhea, nausea and vomiting.   Genitourinary: Negative for dysuria, flank pain, urgency and vaginal discharge.   Musculoskeletal: Negative for back pain and myalgias.     Objective:     Vital Signs (Most Recent):  Temp: 97.6 °F (36.4 °C) (08/19/19 0733)  Pulse: 69 (08/19/19 0809)  Resp: 20 (08/19/19 0809)  BP: (!) 121/59 (08/19/19 0733)  SpO2: (!) 92 % (08/19/19 0809) Vital Signs (24h Range):  Temp:  [97.5 °F (36.4 °C)-98 °F (36.7 °C)] 97.6 °F (36.4 °C)  Pulse:  [60-69] 69  Resp:  [14-22] 20  SpO2:  [92 %-96 %] 92 %  BP: ()/(42-59) 121/59     Weight: 86.1 kg (189 lb 13.1 oz)  Body mass index is 35.87 kg/m².    Intake/Output Summary (Last 24 hours) at 8/19/2019 1044  Last data filed at 8/18/2019 1800  Gross per 24 hour   Intake --   Output 1 ml   Net -1 ml      Physical Exam   Constitutional: She is oriented to person, place, and time. No distress.   Eyes: Pupils are equal, round, and reactive to light. No scleral icterus.   Cardiovascular: Normal rate and regular rhythm.   No murmur heard.  Pulmonary/Chest: Breath sounds normal. She has no wheezes. She has no rales.   Abdominal: Soft. She exhibits no distension. There is no tenderness.   Neurological: She is alert and oriented to person, place, and time.   Skin: Skin is warm. Capillary refill takes less than 2 seconds. No rash noted.   Psychiatric: She has a normal mood and affect.   Nursing note and vitals reviewed.      Significant Labs:   BMP:   Recent Labs   Lab 08/19/19  0359   *  203*   *  130*   K 4.0  4.0   CL 93*  93*   CO2 23  23   BUN 62*  62*   CREATININE 2.1*  2.1*   CALCIUM 8.5*  8.5*   MG 1.8     CBC:   Recent Labs   Lab 08/18/19  0359 08/19/19  0359   WBC 7.61 7.40  7.40   HGB 8.9* 8.6*  8.6*   HCT 28.5*  27.8*  27.8*   * 152  152       Significant Imaging: I have reviewed all pertinent imaging results/findings within the past 24 hours.      Assessment/Plan:      * Sepsis  Gram negative sepsis - blood culture growing E.coli  On iv meropenem - will de-escalate antibiotics per culture sensitivities  Will consult ID MD  Repeat blood culture      SUJEY (acute kidney injury)  Multifactorial:From Hypervolemia,Hypotension associated SUJEY/ATN  Start gentle IV fluid hydration at 75cc/hr    Atrial fibrillation with RVR  Was  in atrial fibrillation with RVR when pt came to ER  Currently in NSR   - continue Amiodarone 200mg BID   - on Eliquis for anticoagulation        Anasarca  Start Pt on iv lasix  On PO Torsemide at home      Elevated troponin  Likely secondary to demand with sepsis as well as tachyarrhythmia.      Polymyalgia rheumatica  Patient on Long term steroids       Anemia        Advanced care planning/counseling discussion  Discussions with patient and 2 daughters present at bedside about code status.  Patient expressed wishes to be do not resuscitate, would want aggressive medical care but no chest compressions, intubation, life support.  Daughters in agreement stating aware low chances of successful resuscitation as well as current poor quality of life.      Chronic diastolic heart failure  Recent hospitalization for decompensated heart failure, EF of 60%.  She has chronic lower extremity swelling/3rd spacing.  Will start pt on iv lasix today      Sacral wound  Sacral wound, POA, likely in the setting of chronic bed-bound status.  - wound care consult      Chronic venous stasis with right leg wound  Chronic venous stasis changes with wound to the right leg in the background of chronic leg edema, receiving local wound care.  - wound care consult      Generalized weakness on background of chronic debility  Likely in the setting of infection/Sepsis and atrial fibrillation with RVR.    Patient has chronic  debility and bed-bound at baseline.      UTI (urinary tract infection)  History of recurrent UTIs, has chronic indwelling graves catheter changed by home health nurse once per month.    Prior urine cultures with Pseudomonas, Proteus, Klebsiella.  Port of entry for gram negative sepsis from here      VTE Risk Mitigation (From admission, onward)        Ordered     apixaban tablet 5 mg  2 times daily      08/16/19 1806     IP VTE HIGH RISK PATIENT  Once      08/16/19 2056          Transfer to Cardio B      Yancy Castillo MD  Department of Hospital Medicine   [facility]

## 2019-08-19 NOTE — SUBJECTIVE & OBJECTIVE
Interval History: no acute events overnight, tolerating PO diet, but decreased fluid intake    Review of Systems   Constitutional: Negative for chills and fever.   HENT: Negative for congestion.    Respiratory: Negative for shortness of breath and wheezing.    Cardiovascular: Negative for chest pain and palpitations.   Gastrointestinal: Negative for constipation, diarrhea, nausea and vomiting.   Genitourinary: Negative for dysuria, flank pain, urgency and vaginal discharge.   Musculoskeletal: Negative for back pain and myalgias.     Objective:     Vital Signs (Most Recent):  Temp: 97.6 °F (36.4 °C) (08/19/19 0733)  Pulse: 69 (08/19/19 0809)  Resp: 20 (08/19/19 0809)  BP: (!) 121/59 (08/19/19 0733)  SpO2: (!) 92 % (08/19/19 0809) Vital Signs (24h Range):  Temp:  [97.5 °F (36.4 °C)-98 °F (36.7 °C)] 97.6 °F (36.4 °C)  Pulse:  [60-69] 69  Resp:  [14-22] 20  SpO2:  [92 %-96 %] 92 %  BP: ()/(42-59) 121/59     Weight: 86.1 kg (189 lb 13.1 oz)  Body mass index is 35.87 kg/m².    Intake/Output Summary (Last 24 hours) at 8/19/2019 1044  Last data filed at 8/18/2019 1800  Gross per 24 hour   Intake --   Output 1 ml   Net -1 ml      Physical Exam   Constitutional: She is oriented to person, place, and time. No distress.   Eyes: Pupils are equal, round, and reactive to light. No scleral icterus.   Cardiovascular: Normal rate and regular rhythm.   No murmur heard.  Pulmonary/Chest: Breath sounds normal. She has no wheezes. She has no rales.   Abdominal: Soft. She exhibits no distension. There is no tenderness.   Neurological: She is alert and oriented to person, place, and time.   Skin: Skin is warm. Capillary refill takes less than 2 seconds. No rash noted.   Psychiatric: She has a normal mood and affect.   Nursing note and vitals reviewed.      Significant Labs:   BMP:   Recent Labs   Lab 08/19/19  0359   *  203*   *  130*   K 4.0  4.0   CL 93*  93*   CO2 23  23   BUN 62*  62*   CREATININE 2.1*  2.1*    CALCIUM 8.5*  8.5*   MG 1.8     CBC:   Recent Labs   Lab 08/18/19  0359 08/19/19  0359   WBC 7.61 7.40  7.40   HGB 8.9* 8.6*  8.6*   HCT 28.5* 27.8*  27.8*   * 152  152       Significant Imaging: I have reviewed all pertinent imaging results/findings within the past 24 hours.

## 2019-08-19 NOTE — CONSULTS
INPATIENT NEPHROLOGY CONSULT   [facility] NEPHROLOGY    Patient Name: Vee Pizano  Date: 08/19/2019    Reason for consultation: SUJEY    Chief Complaint:   Chief Complaint   Patient presents with    Weakness       History of Present Illness:  Mrs. Pizano is a 86-year-old female who presented to the ED via EMS with chief complaint of generalized weakness and foul-smelling urine.  Collateral was obtained from daughters who were present at bedside.  Reported yesterday patient with increased somnolence and minimal oral intake associated with increased confusion and generalized weakness.  Daughters state these symptoms usually occur when patient has urinary tract infection, has a history of recurrent UTIs. Patient has chronic indwelling graves catheter, changed once monthly by home health nurse, unsure date of most recent change.  Low-grade fever at home at 99 as per home health nurse.  On EMS arrival blood pressure was low 80/40 with heart rates up to the 150s.  In the ED patient with atrial fibrillation with rapid ventricular response, received 1 L LR with improvement in blood pressure. She was started on IV amiodarone bolus and infusion as per protocol.  Graves was changed and UA/urine culture was sent in the ED.  She received IV meropenem for empiric coverage as well as 2 g IV magnesium as was noted to have frequent PVCs on telemetry. She has remained hypotensive. She reports gen weakness, no exac/reliev factors. We are consulted for SUJEY.    Plan of Care:    Assessment:  UTI  SUJEY 2/2 hypotension, volume depletion and arrhythmia  Hypotension/hx of CHF  AF with RVR  Hyponatremia  HypoMg  Anemia    Plan:    - She is on IV antbx.  - D/C IV lasix. Agree with NS fluid challenge 75cc/hr. Monitor UOP. No NSAIDs or IV contrast.  - If remains hypotensive, will need UTD echo. Hold losartan/torsemide. Ordered AM cortisol.  - She is edematous but suspect intravascularly volume down. Ordered urine Na.  - She is now rate  controlled.  - Serum Na is better with volume. Continue to trend.  - She got IV Mg. Hold diuretics.   - Hb is stable. Ordered iron studies.    Thank you for allowing us to participate in this patient's care. We will continue to follow.    Vital Signs:  Temp Readings from Last 3 Encounters:   19 97.9 °F (36.6 °C) (Oral)   18 99.2 °F (37.3 °C) (Oral)   13 99.3 °F (37.4 °C) (Oral)       Pulse Readings from Last 3 Encounters:   19 61   18 77   13 73       BP Readings from Last 3 Encounters:   19 (!) 111/51   18 124/63   18 100/62       Weight:  Wt Readings from Last 3 Encounters:   19 86.1 kg (189 lb 13.1 oz)   18 70.3 kg (155 lb)   18 69.9 kg (154 lb)       Past Medical & Surgical History:  Past Medical History:   Diagnosis Date    Chronic atrial fibrillation     Diabetes mellitus     Hyperlipidemia     Hypertension     PMR (polymyalgia rheumatica)     Rheumatoid arthritis     Thyroid disease        Past Surgical History:   Procedure Laterality Date    APPENDECTOMY      BREAST BIOPSY Right     CARDIAC PACEMAKER PLACEMENT      CATARACT EXTRACTION Right     CHOLECYSTECTOMY      HYSTERECTOMY      JOINT REPLACEMENT Left     knee    TONSILLECTOMY         Past Social History:  Social History     Socioeconomic History    Marital status:      Spouse name: Not on file    Number of children: Not on file    Years of education: Not on file    Highest education level: Not on file   Occupational History    Not on file   Social Needs    Financial resource strain: Not on file    Food insecurity:     Worry: Not on file     Inability: Not on file    Transportation needs:     Medical: Not on file     Non-medical: Not on file   Tobacco Use    Smoking status: Former Smoker     Packs/day: 1.00     Years: 10.00     Pack years: 10.00     Types: Cigarettes     Last attempt to quit: 1967     Years since quittin.6    Smokeless tobacco:  Never Used   Substance and Sexual Activity    Alcohol use: No    Drug use: No    Sexual activity: Not on file   Lifestyle    Physical activity:     Days per week: Not on file     Minutes per session: Not on file    Stress: Not on file   Relationships    Social connections:     Talks on phone: Not on file     Gets together: Not on file     Attends [employer]ism service: Not on file     Active member of club or organization: Not on file     Attends meetings of clubs or organizations: Not on file     Relationship status: Not on file   Other Topics Concern    Not on file   Social History Narrative    Not on file       Medications:  No current facility-administered medications on file prior to encounter.      Current Outpatient Medications on File Prior to Encounter   Medication Sig Dispense Refill    allopurinol (ZYLOPRIM) 100 MG tablet Take 100 mg by mouth 2 (two) times daily.       apixaban (ELIQUIS) 5 mg Tab Take 5 mg by mouth 2 (two) times daily.      aspirin 81 MG Chew Take 81 mg by mouth every morning.       clotrimazole-betamethasone 1-0.05% (LOTRISONE) cream Apply 1 g topically 2 (two) times daily.      esomeprazole (NEXIUM) 40 MG capsule Take 40 mg by mouth every morning.       gabapentin (NEURONTIN) 100 MG capsule Take 1 capsule (100 mg total) by mouth 3 (three) times daily. 90 capsule 3    levothyroxine (SYNTHROID) 150 MCG tablet Take 150 mcg by mouth every morning.       losartan (COZAAR) 50 MG tablet Take 50 mg by mouth every morning.       metoprolol succinate (TOPROL-XL) 50 MG 24 hr tablet Take 50 mg by mouth every evening.      potassium chloride SA (K-DUR,KLOR-CON) 20 MEQ tablet Take 40 mEq by mouth 2 (two) times daily.      predniSONE (DELTASONE) 2.5 MG tablet Take 1 tablet (2.5 mg total) by mouth 3 (three) times daily. NEEDS APPT (Patient taking differently: Take 2.5 mg by mouth 3 (three) times daily. ) 90 tablet 1    sertraline (ZOLOFT) 50 MG tablet Take 50 mg by mouth every morning.   "    sotalol (BETAPACE) 80 MG tablet Take 80 mg by mouth 2 (two) times daily.       torsemide (DEMADEX) 20 MG Tab Take 20 mg by mouth 2 (two) times daily.      VITAMIN D2 50,000 unit capsule Take 50,000 Units by mouth every 7 days. ON MONDAYS      LANTUS U-100 INSULIN 100 unit/mL injection Inject 10 Units into the skin daily as needed.       NOVOLOG FLEXPEN U-100 INSULIN 100 unit/mL InPn pen Inject 5 Units into the skin 3 (three) times daily as needed (per sliding scale, hold for BGL<150).       traMADol (ULTRAM) 50 mg tablet Take 50 mg by mouth every 4 (four) hours as needed.        Scheduled Meds:   amiodarone  200 mg Oral BID    apixaban  5 mg Oral BID    aspirin  81 mg Oral QAM    clotrimazole-betamethasone 1-0.05%  1 g Topical (Top) BID    furosemide (LASIX) IV  20 mg Intravenous Q12H    gabapentin  100 mg Oral TID    levothyroxine  150 mcg Oral QAM    meropenem (MERREM) IVPB  500 mg Intravenous Q12H    pantoprazole  40 mg Oral Daily    polyethylene glycol  17 g Oral Daily    predniSONE  2 mg Oral TID    sertraline  50 mg Oral QAM     Continuous Infusions:   sodium chloride 0.9% 75 mL/hr at 08/19/19 1205     PRN Meds:.acetaminophen, bisacodyl, dextrose 50%, dextrose 50%, glucagon (human recombinant), glucose, glucose, insulin aspart U-100, magnesium oxide, magnesium oxide, ondansetron, potassium chloride 10%, potassium chloride 10%, potassium chloride 10%, potassium, sodium phosphates, potassium, sodium phosphates, potassium, sodium phosphates, sodium chloride 0.9%, traMADol, trazodone    Allergies:  Patient has no known allergies.    Past Family History:  Reviewed; refer to Hospitalist Admission Note    Review of Systems:  Review of Systems - All 14 systems reviewed and negative, except as noted in HPI    Physical Exam:  BP (!) 111/51   Pulse 61   Temp 97.9 °F (36.6 °C) (Oral)   Resp 15   Ht 5' 1" (1.549 m)   Wt 86.1 kg (189 lb 13.1 oz)   SpO2 97%   Breastfeeding? No   BMI 35.87 kg/m² "     INS/OUTS:  I/O last 3 completed shifts:  In: 158.7 [I.V.:158.7]  Out: 151 [Urine:150; Stool:1]  I/O this shift:  In: -   Out: 150 [Urine:150]    General Appearance:    NAD, AAO x 3, cooperative, appears stated age   Head:    Normocephalic, atraumatic   Eyes:    PER, EOMI, and conjunctiva/sclera clear bilaterally        Throat:   Moist mucus membranes   Lungs:     Clear to auscultation bilaterally, no wheeze, crackles, rales      or rhonchi, symmetric air movement, respirations unlabored   Heart:    Irreg irreg, S1 and S2 normal, no murmur, rub   or gallop   Abdomen:     Soft, non-tender, non-distended, bowel sounds active all four   quadrants, no RT or guarding, no masses, no organomegaly   Extremities:   Warm and well perfused, + edema   MSK:   No joint or muscle swelling, tenderness or deformity   Skin:   Skin color, texture, turgor normal, no rashes or lesions   Neurologic/Psychiatric:   CNII-XII intact, normal strength and sensation       throughout, no asterixis; normal affect, memory, judgement     and insight     Results:  Lab Results   Component Value Date     (L) 08/19/2019     (L) 08/19/2019    K 4.0 08/19/2019    K 4.0 08/19/2019    CL 93 (L) 08/19/2019    CL 93 (L) 08/19/2019    CO2 23 08/19/2019    CO2 23 08/19/2019    BUN 62 (H) 08/19/2019    BUN 62 (H) 08/19/2019    CREATININE 2.1 (H) 08/19/2019    CREATININE 2.1 (H) 08/19/2019    CALCIUM 8.5 (L) 08/19/2019    CALCIUM 8.5 (L) 08/19/2019    ANIONGAP 14 08/19/2019    ANIONGAP 14 08/19/2019    ESTGFRAFRICA 24.0 (A) 08/19/2019    ESTGFRAFRICA 24.0 (A) 08/19/2019    EGFRNONAA 20.9 (A) 08/19/2019    EGFRNONAA 20.9 (A) 08/19/2019       Lab Results   Component Value Date    CALCIUM 8.5 (L) 08/19/2019    CALCIUM 8.5 (L) 08/19/2019    PHOS 4.0 08/17/2019       Recent Labs   Lab 08/19/19  0359   WBC 7.40  7.40   RBC 2.92*  2.92*   HGB 8.6*  8.6*   HCT 27.8*  27.8*     152   MCV 95  95   MCH 29.5  29.5   MCHC 30.9*  30.9*       I  have personally reviewed pertinent radiological imaging and reports.    Pranay Flores MD MPH  [facility] [address] [address]  [phone] (p)  [phone] (f)

## 2019-08-19 NOTE — PROGRESS NOTES
Renal Dosing of Medication    An order has been entered by a pharmacist to adjust the dose and/or frequency of the medication listed below based on the patient's renal function.    Objective:  86 y.o., female, Actual Body Weight = 86.1 kg (189 lb 13.1 oz)    Current Regimen:  Meropenem 500 mg IV Q12H    Assessment:  Estimated Creatinine Clearance: 19.2 mL/min (A) (based on SCr of 2.1 mg/dL (H)).  Renal function is declining.    Plan:  Orders have been entered to adjust the dose and/or frequency based on the patient's weight and renal function:  Meropenem 500 mg IV every 24 hours.    Thank you for allowing us to participate in this patient's care.     Kristi Vu 8/19/2019 11:18 AM  Department of Pharmacy  Ext [phone]

## 2019-08-19 NOTE — PROGRESS NOTES
[facility]    Cardiology Progress Note    Subjective:  Seen and examined this morning. Telemetry pacing rhythm, no underlying A. Fib. Blood pressure stable.Patient is awake and alert sitting up in bed eating breakfast. Denies any active cardiac complaints. Creatinine elevated to 2.1 from 1.7 yesterday. Baseline is around 1.0. Currently on Lasix 20 mg BID.  Per recent office note with Dr. Joseph -- patient has been on IV Lasix therapy with home health twice weekly in the recent past on top of Torsemide twice daily. Hemoglobin 8.6. Sodium 130. Procalcitonin 0.57 with gram-negative rods growing in recent BCx. Chest x-ray demonstrated no evidence of effusion/edema.      Objective:  Vital Signs (Most Recent)  Temp: 97.6 °F (36.4 °C) (08/19/19 0733)  Pulse: 69 (08/19/19 0809)  Resp: 20 (08/19/19 0809)  BP: (!) 121/59 (08/19/19 0733)  SpO2: (!) 92 % (08/19/19 0809)    Vital Signs Range (Last 24H):  Temp:  [97.5 °F (36.4 °C)-98 °F (36.7 °C)]   Pulse:  [60-69]   Resp:  [14-22]   BP: ()/(42-59)   SpO2:  [92 %-96 %]     I & O (Last 24H):    Intake/Output Summary (Last 24 hours) at 8/19/2019 0847  Last data filed at 8/18/2019 1800  Gross per 24 hour   Intake --   Output 1 ml   Net -1 ml       Current Diet:     Current Diet Order   Procedures    Diet diabetic [facility]; 1800 Calorie; Standard Tray     Order Specific Question:   Indicate patient location for additional diet options:     Answer:   [facility]     Order Specific Question:   Total calories:     Answer:   1800 Calorie     Order Specific Question:   Tray type:     Answer:   Standard Tray        Allergies:  Review of patient's allergies indicates:  No Known Allergies    Meds:  Scheduled Meds:   apixaban  5 mg Oral BID    aspirin  81 mg Oral QAM    clotrimazole-betamethasone 1-0.05%  1 g Topical (Top) BID    furosemide (LASIX) IV  20 mg Intravenous Q12H    gabapentin  100 mg Oral TID    levothyroxine  150 mcg Oral QAM    meropenem (MERREM) IVPB  500 mg  Intravenous Q12H    pantoprazole  40 mg Oral Daily    polyethylene glycol  17 g Oral Daily    predniSONE  2 mg Oral TID    sertraline  50 mg Oral QAM     Continuous Infusions:   amiodarone in dextrose 5% Stopped (08/17/19 1930)     PRN Meds:acetaminophen, bisacodyl, dextrose 50%, dextrose 50%, glucagon (human recombinant), glucose, glucose, insulin aspart U-100, magnesium oxide, magnesium oxide, ondansetron, potassium chloride 10%, potassium chloride 10%, potassium chloride 10%, potassium, sodium phosphates, potassium, sodium phosphates, potassium, sodium phosphates, sodium chloride 0.9%, traMADol, trazodone    Lab Results :  Recent Results (from the past 24 hour(s))   POCT glucose    Collection Time: 08/18/19 11:23 AM   Result Value Ref Range    POC Glucose 142 (H) 70 - 110   POCT glucose    Collection Time: 08/18/19  4:30 PM   Result Value Ref Range    POC Glucose 163 (H) 70 - 110   POCT glucose    Collection Time: 08/18/19  8:29 PM   Result Value Ref Range    POC Glucose 170 (H) 70 - 110   CBC auto differential    Collection Time: 08/19/19  3:59 AM   Result Value Ref Range    WBC 7.40 3.90 - 12.70 K/uL    RBC 2.92 (L) 4.00 - 5.40 M/uL    Hemoglobin 8.6 (L) 12.0 - 16.0 g/dL    Hematocrit 27.8 (L) 37.0 - 48.5 %    Mean Corpuscular Volume 95 82 - 98 fL    Mean Corpuscular Hemoglobin 29.5 27.0 - 31.0 pg    Mean Corpuscular Hemoglobin Conc 30.9 (L) 32.0 - 36.0 g/dL    RDW 17.4 (H) 11.5 - 14.5 %    Platelets 152 150 - 350 K/uL    MPV 10.6 9.2 - 12.9 fL    Immature Granulocytes 3.5 (H) 0.0 - 0.5 %    Gran # (ANC) 5.9 1.8 - 7.7 K/uL    Immature Grans (Abs) 0.26 (H) 0.00 - 0.04 K/uL    Lymph # 0.8 (L) 1.0 - 4.8 K/uL    Mono # 0.3 0.3 - 1.0 K/uL    Eos # 0.2 0.0 - 0.5 K/uL    Baso # 0.01 0.00 - 0.20 K/uL    nRBC 0 0 /100 WBC    Gran% 80.2 (H) 38.0 - 73.0 %    Lymph% 10.4 (L) 18.0 - 48.0 %    Mono% 3.8 (L) 4.0 - 15.0 %    Eosinophil% 2.0 0.0 - 8.0 %    Basophil% 0.1 0.0 - 1.9 %    Differential Method Automated     Comprehensive metabolic panel    Collection Time: 08/19/19  3:59 AM   Result Value Ref Range    Sodium 130 (L) 136 - 145 mmol/L    Potassium 4.0 3.5 - 5.1 mmol/L    Chloride 93 (L) 95 - 110 mmol/L    CO2 23 23 - 29 mmol/L    Glucose 203 (H) 70 - 110 mg/dL    BUN, Bld 62 (H) 8 - 23 mg/dL    Creatinine 2.1 (H) 0.5 - 1.4 mg/dL    Calcium 8.5 (L) 8.7 - 10.5 mg/dL    Total Protein 5.4 (L) 6.0 - 8.4 g/dL    Albumin 2.1 (L) 3.5 - 5.2 g/dL    Total Bilirubin 0.6 0.1 - 1.0 mg/dL    Alkaline Phosphatase 71 55 - 135 U/L    AST 27 10 - 40 U/L    ALT 31 10 - 44 U/L    Anion Gap 14 8 - 16 mmol/L    eGFR if African American 24.0 (A) >60 mL/min/1.73 m^2    eGFR if non African American 20.9 (A) >60 mL/min/1.73 m^2   CBC Without Differential    Collection Time: 08/19/19  3:59 AM   Result Value Ref Range    WBC 7.40 3.90 - 12.70 K/uL    RBC 2.92 (L) 4.00 - 5.40 M/uL    Hemoglobin 8.6 (L) 12.0 - 16.0 g/dL    Hematocrit 27.8 (L) 37.0 - 48.5 %    Mean Corpuscular Volume 95 82 - 98 fL    Mean Corpuscular Hemoglobin 29.5 27.0 - 31.0 pg    Mean Corpuscular Hemoglobin Conc 30.9 (L) 32.0 - 36.0 g/dL    RDW 17.4 (H) 11.5 - 14.5 %    Platelets 152 150 - 350 K/uL    MPV 10.6 9.2 - 12.9 fL   Comprehensive metabolic panel    Collection Time: 08/19/19  3:59 AM   Result Value Ref Range    Sodium 130 (L) 136 - 145 mmol/L    Potassium 4.0 3.5 - 5.1 mmol/L    Chloride 93 (L) 95 - 110 mmol/L    CO2 23 23 - 29 mmol/L    Glucose 203 (H) 70 - 110 mg/dL    BUN, Bld 62 (H) 8 - 23 mg/dL    Creatinine 2.1 (H) 0.5 - 1.4 mg/dL    Calcium 8.5 (L) 8.7 - 10.5 mg/dL    Total Protein 5.4 (L) 6.0 - 8.4 g/dL    Albumin 2.1 (L) 3.5 - 5.2 g/dL    Total Bilirubin 0.6 0.1 - 1.0 mg/dL    Alkaline Phosphatase 71 55 - 135 U/L    AST 27 10 - 40 U/L    ALT 31 10 - 44 U/L    Anion Gap 14 8 - 16 mmol/L    eGFR if African American 24.0 (A) >60 mL/min/1.73 m^2    eGFR if non African American 20.9 (A) >60 mL/min/1.73 m^2   Procalcitonin    Collection Time: 08/19/19  3:59 AM  "  Result Value Ref Range    Procalcitonin 0.51 (H) 0.00 - 0.50 ng/mL   C-reactive protein    Collection Time: 08/19/19  3:59 AM   Result Value Ref Range    CRP 22.38 (H) 0.00 - 0.75 mg/dL   Magnesium    Collection Time: 08/19/19  3:59 AM   Result Value Ref Range    Magnesium 1.8 1.6 - 2.6 mg/dL   POCT glucose    Collection Time: 08/19/19  7:45 AM   Result Value Ref Range    POC Glucose 231 (H) 70 - 110       Diagnostic Results:  Imaging Results          X-Ray Chest AP Portable (Final result)  Result time 08/16/19 17:44:51    Final result by Floyd Wells MD (08/16/19 17:44:51)                 Impression:      Left lung base pleuroparenchymal opacity could reflect airspace disease, atelectasis, pleural effusion, or some combination thereof.  Follow-up PA and lateral chest radiography may be helpful.      Electronically signed by: Floyd Wells MD  Date:    08/16/2019  Time:    17:44             Narrative:    EXAMINATION:  XR CHEST AP PORTABLE    CLINICAL HISTORY:  Sepsis;    COMPARISON:  06/05/2019    FINDINGS:  Cardiac silhouette size is enlarged.  Atherosclerotic calcification of the aorta.  Left subclavian pacing leads are in stable position.  There is pleuroparenchymal opacity at the left lung base.  Right lung is clear.  No pneumothorax.  No acute osseous abnormality.                                Physical Exam:  Objective:  General Appearance:  Comfortable.    Vital signs: (most recent): Blood pressure (!) 121/59, pulse 69, temperature 97.6 °F (36.4 °C), temperature source Oral, resp. rate 20, height 5' 1" (1.549 m), weight 86.1 kg (189 lb 13.1 oz), SpO2 (!) 92 %, not currently breastfeeding.    Lungs:  Normal effort and normal respiratory rate.  Breath sounds clear to auscultation.    Heart: Normal rate.  Regular rhythm.  S1 normal and S2 normal.  No gallop.   Abdomen: Abdomen is soft.  Bowel sounds are normal.     Extremities: There is no local swelling.        Current Consults:  IP CONSULT TO HOSPITAL " MEDICINE  IP CONSULT TO CARDIOLOGY  WOUND CARE CONSULT  WOUND CARE CONSULT  IP CONSULT TO SOCIAL WORK/CASE MANAGEMENT  IP CONSULT TO INFECTIOUS DISEASES    Assessment/Plan:  Assessment:   1. Urosepsis  2. Hypotension   3. Afib with rvr on Eliquis/Sotalol/Toprol --now in sinus  4. DM   5. Polymyalgia rheumatica   6. Encephalopathy   7. Moderate AS GIOVANNA 1.3 mean gradient 22 mmHg     Plan:   Transition to PO Amiodarone 200 mg BID.   Continue current plan of care per hospital medicine.   Consult nephrology for worsening SUJEY.  Monitor hemoglobin, goal > 8.5 with AF.    Titus Landrum PA-C  08/19/19

## 2019-08-19 NOTE — ASSESSMENT & PLAN NOTE
Was  in atrial fibrillation with RVR when pt came to ER  Currently in NSR   - continue Amiodarone 200mg BID   - on Eliquis for anticoagulation

## 2019-08-19 NOTE — ASSESSMENT & PLAN NOTE
Multifactorial:From Hypervolemia,Hypotension associated SUJEY/ATN  Start gentle IV fluid hydration at 75cc/hr

## 2019-08-20 LAB
ALBUMIN SERPL BCP-MCNC: 2.1 G/DL (ref 3.5–5.2)
ALP SERPL-CCNC: 68 U/L (ref 55–135)
ALT SERPL W/O P-5'-P-CCNC: 23 U/L (ref 10–44)
ANION GAP SERPL CALC-SCNC: 12 MMOL/L (ref 8–16)
AST SERPL-CCNC: 16 U/L (ref 10–40)
BASOPHILS # BLD AUTO: 0.02 K/UL (ref 0–0.2)
BASOPHILS NFR BLD: 0.3 % (ref 0–1.9)
BILIRUB SERPL-MCNC: 0.6 MG/DL (ref 0.1–1)
BUN SERPL-MCNC: 73 MG/DL (ref 8–23)
CALCIUM SERPL-MCNC: 7.9 MG/DL (ref 8.7–10.5)
CHLORIDE SERPL-SCNC: 93 MMOL/L (ref 95–110)
CO2 SERPL-SCNC: 22 MMOL/L (ref 23–29)
CORTIS SERPL-MCNC: 5.2 UG/DL
CREAT SERPL-MCNC: 2.4 MG/DL (ref 0.5–1.4)
DIFFERENTIAL METHOD: ABNORMAL
EOSINOPHIL # BLD AUTO: 0.2 K/UL (ref 0–0.5)
EOSINOPHIL NFR BLD: 2.2 % (ref 0–8)
ERYTHROCYTE [DISTWIDTH] IN BLOOD BY AUTOMATED COUNT: 17.3 % (ref 11.5–14.5)
EST. GFR  (AFRICAN AMERICAN): 20.5 ML/MIN/1.73 M^2
EST. GFR  (NON AFRICAN AMERICAN): 17.7 ML/MIN/1.73 M^2
GLUCOSE SERPL-MCNC: 151 MG/DL (ref 70–110)
GLUCOSE SERPL-MCNC: 157 MG/DL (ref 70–110)
GLUCOSE SERPL-MCNC: 174 MG/DL (ref 70–110)
GLUCOSE SERPL-MCNC: 174 MG/DL (ref 70–110)
GLUCOSE SERPL-MCNC: 202 MG/DL (ref 70–110)
HCT VFR BLD AUTO: 29.1 % (ref 37–48.5)
HGB BLD-MCNC: 8.9 G/DL (ref 12–16)
IMM GRANULOCYTES # BLD AUTO: 0.3 K/UL (ref 0–0.04)
IMM GRANULOCYTES NFR BLD AUTO: 4 % (ref 0–0.5)
LYMPHOCYTES # BLD AUTO: 1 K/UL (ref 1–4.8)
LYMPHOCYTES NFR BLD: 14 % (ref 18–48)
MAGNESIUM SERPL-MCNC: 1.9 MG/DL (ref 1.6–2.6)
MCH RBC QN AUTO: 29.4 PG (ref 27–31)
MCHC RBC AUTO-ENTMCNC: 30.6 G/DL (ref 32–36)
MCV RBC AUTO: 96 FL (ref 82–98)
MONOCYTES # BLD AUTO: 0.3 K/UL (ref 0.3–1)
MONOCYTES NFR BLD: 3.9 % (ref 4–15)
NEUTROPHILS # BLD AUTO: 5.6 K/UL (ref 1.8–7.7)
NEUTROPHILS NFR BLD: 75.6 % (ref 38–73)
NRBC BLD-RTO: 0 /100 WBC
PLATELET # BLD AUTO: 177 K/UL (ref 150–350)
PMV BLD AUTO: 11 FL (ref 9.2–12.9)
POTASSIUM SERPL-SCNC: 3.5 MMOL/L (ref 3.5–5.1)
PROT SERPL-MCNC: 5.3 G/DL (ref 6–8.4)
RBC # BLD AUTO: 3.03 M/UL (ref 4–5.4)
SODIUM SERPL-SCNC: 127 MMOL/L (ref 136–145)
WBC # BLD AUTO: 7.43 K/UL (ref 3.9–12.7)

## 2019-08-20 PROCEDURE — 25000003 PHARM REV CODE 250: Performed by: INTERNAL MEDICINE

## 2019-08-20 PROCEDURE — 83735 ASSAY OF MAGNESIUM: CPT

## 2019-08-20 PROCEDURE — 25000003 PHARM REV CODE 250: Performed by: PHYSICIAN ASSISTANT

## 2019-08-20 PROCEDURE — 82533 TOTAL CORTISOL: CPT

## 2019-08-20 PROCEDURE — 85025 COMPLETE CBC W/AUTO DIFF WBC: CPT

## 2019-08-20 PROCEDURE — 36415 COLL VENOUS BLD VENIPUNCTURE: CPT

## 2019-08-20 PROCEDURE — 94761 N-INVAS EAR/PLS OXIMETRY MLT: CPT

## 2019-08-20 PROCEDURE — 63600175 PHARM REV CODE 636 W HCPCS: Performed by: INTERNAL MEDICINE

## 2019-08-20 PROCEDURE — 36410 VNPNXR 3YR/> PHY/QHP DX/THER: CPT

## 2019-08-20 PROCEDURE — 21400001 HC TELEMETRY ROOM

## 2019-08-20 PROCEDURE — 97166 OT EVAL MOD COMPLEX 45 MIN: CPT

## 2019-08-20 PROCEDURE — 97161 PT EVAL LOW COMPLEX 20 MIN: CPT

## 2019-08-20 PROCEDURE — 97535 SELF CARE MNGMENT TRAINING: CPT

## 2019-08-20 PROCEDURE — 80053 COMPREHEN METABOLIC PANEL: CPT

## 2019-08-20 RX ORDER — POTASSIUM CHLORIDE 20 MEQ/1
20 TABLET, EXTENDED RELEASE ORAL ONCE
Status: COMPLETED | OUTPATIENT
Start: 2019-08-20 | End: 2019-08-20

## 2019-08-20 RX ADMIN — PREDNISONE 2 MG: 1 TABLET ORAL at 11:08

## 2019-08-20 RX ADMIN — AMIODARONE HYDROCHLORIDE 200 MG: 200 TABLET ORAL at 08:08

## 2019-08-20 RX ADMIN — SERTRALINE HYDROCHLORIDE 50 MG: 50 TABLET ORAL at 08:08

## 2019-08-20 RX ADMIN — GABAPENTIN 100 MG: 100 CAPSULE ORAL at 03:08

## 2019-08-20 RX ADMIN — LEVOTHYROXINE SODIUM 150 MCG: 0.03 TABLET ORAL at 05:08

## 2019-08-20 RX ADMIN — APIXABAN 5 MG: 5 TABLET, FILM COATED ORAL at 09:08

## 2019-08-20 RX ADMIN — GABAPENTIN 100 MG: 100 CAPSULE ORAL at 08:08

## 2019-08-20 RX ADMIN — CLOTRIMAZOLE AND BETAMETHASONE DIPROPIONATE 1 G: 10; .5 CREAM TOPICAL at 09:08

## 2019-08-20 RX ADMIN — GABAPENTIN 100 MG: 100 CAPSULE ORAL at 09:08

## 2019-08-20 RX ADMIN — LACTOBACILLUS TAB 1 TABLET: TAB at 12:08

## 2019-08-20 RX ADMIN — PREDNISONE 2 MG: 1 TABLET ORAL at 03:08

## 2019-08-20 RX ADMIN — LACTOBACILLUS TAB 1 TABLET: TAB at 06:08

## 2019-08-20 RX ADMIN — ASPIRIN 81 MG 81 MG: 81 TABLET ORAL at 08:08

## 2019-08-20 RX ADMIN — PREDNISONE 2 MG: 1 TABLET ORAL at 09:08

## 2019-08-20 RX ADMIN — SODIUM CHLORIDE: 0.9 INJECTION, SOLUTION INTRAVENOUS at 12:08

## 2019-08-20 RX ADMIN — APIXABAN 5 MG: 5 TABLET, FILM COATED ORAL at 08:08

## 2019-08-20 RX ADMIN — POTASSIUM CHLORIDE 20 MEQ: 20 TABLET, EXTENDED RELEASE ORAL at 03:08

## 2019-08-20 RX ADMIN — LACTOBACILLUS TAB 1 TABLET: TAB at 08:08

## 2019-08-20 RX ADMIN — PANTOPRAZOLE SODIUM 40 MG: 40 TABLET, DELAYED RELEASE ORAL at 05:08

## 2019-08-20 RX ADMIN — METOPROLOL SUCCINATE 12.5 MG: 25 TABLET, EXTENDED RELEASE ORAL at 12:08

## 2019-08-20 RX ADMIN — CEFTRIAXONE 2 G: 2 INJECTION, SOLUTION INTRAVENOUS at 03:08

## 2019-08-20 NOTE — PLAN OF CARE
08/19/19 1925   PRE-TX-O2   O2 Device (Oxygen Therapy) room air   SpO2 96 %   Pulse Oximetry Type Continuous   Pulse 66   Resp 18

## 2019-08-20 NOTE — PT/OT/SLP EVAL
"Physical Therapy Evaluation and Discharge Note    Patient Name:  Vee Pizano   MRN:  [id number]    Recommendations:     Discharge Recommendations:  nursing facility, basic   Discharge Equipment Recommendations:     Barriers to discharge: Decreased caregiver support pt is dependent for care at baseline    Assessment:     Vee Pizano is a 86 y.o. female admitted with a medical diagnosis of Sepsis. .  At this time, patient is functioning at their prior level of function and does not require further acute PT services.     Recent Surgery: * No surgery found *      Plan:     During this hospitalization, patient does not require further acute PT services.  Please re-consult if situation changes.      Subjective     Chief Complaint: Pt states she is sore from trying to get up to EOB earlier with OT; pt states she is unable to help or participate and this is her baseline at home  Patient/Family Comments/goals: home  Pain/Comfort:  · Pain Rating 1: (unable to give number "it just hurts." )  · Location - Side 1: Bilateral  · Location - Orientation 1: posterior  · Location 1: neck  · Pain Addressed 1: Reposition  · Pain Rating Post-Intervention 1: (unable to rate)    Patients cultural, spiritual, Nondenominational conflicts given the current situation:      Living Environment:  Pt lives at home with spouse.   Prior to admission, patients level of function was bed bound in hospital bed. Pt states  will use lift device to help her get to wheelchair every so often.  Equipment used at home: wheelchair, hospital bed, lift device.  DME owned (not currently used): rolling walker.  Upon discharge, patient will have assistance from spouse.    Objective:     Communicated with RN prior to session.  Patient found supine with graves catheter, peripheral IV, SCD upon PT entry to room.    General Precautions: Standard, fall   Orthopedic Precautions:    Braces:       Exams:  · Cognitive Exam:  Patient is oriented to Person and " Place  · RLE ROM: Deficits: decreased flexion at knees and hips; limited AROM and PROM  · RLE Strength: Deficits: grossly 1/5  · LLE ROM: Deficits: decreased flexion at knees and hips; limited AROM and PROM  · LLE Strength: Deficits: grossly 1/5    Functional Mobility:  · Bed Mobility:   Pt not agreeable to rolling or attempting sitting EOB due to just attempted with OT; per OT pt is total A for bed mobility    AM-PAC 6 CLICK MOBILITY  Total Score:8       Therapeutic Activities and Exercises:   edu on role of PT    AM-PAC 6 CLICK MOBILITY  Total Score:8     Patient left supine with call button in reach.    GOALS:   Multidisciplinary Problems     Physical Therapy Goals     Not on file                History:     Past Medical History:   Diagnosis Date    Chronic atrial fibrillation     Diabetes mellitus     Hyperlipidemia     Hypertension     PMR (polymyalgia rheumatica)     Rheumatoid arthritis     Thyroid disease        Past Surgical History:   Procedure Laterality Date    APPENDECTOMY      BREAST BIOPSY Right     CARDIAC PACEMAKER PLACEMENT      CATARACT EXTRACTION Right     CHOLECYSTECTOMY      HYSTERECTOMY      JOINT REPLACEMENT Left     knee    TONSILLECTOMY         Time Tracking:     PT Received On: 08/20/19  PT Start Time: 1114     PT Stop Time: 1124  PT Total Time (min): 10 min     Billable Minutes: Evaluation 10      Hanane Reed, PT  08/20/2019

## 2019-08-20 NOTE — ASSESSMENT & PLAN NOTE
Recent hospitalization for decompensated heart failure, EF of 60%.  She has chronic lower extremity swelling/3rd spacing.  Diuretics on hold due to worsening renal function

## 2019-08-20 NOTE — PROGRESS NOTES
86 yr old female  Awake and appears very weak. Generalized edema      08/20/19 1136        Wound 08/16/19 2300 Moisture associated dermatitis Buttocks   Date First Assessed/Time First Assessed: 08/16/19 2300   Pre-existing: Yes  Primary Wound Type: (c) Moisture associated dermatitis  Side: (c)   Location: Buttocks   Wound Image      Dressing Appearance Open to air   Drainage Amount Scant   Drainage Characteristics/Odor Clear   Appearance Red;Moist   Tissue loss description Partial thickness   Periwound Area Denuded;Redness   Care Cleansed with:;Antimicrobial agent;Applied:;Other (see comments)  (apply Triad and place on air flow pads  Change pads prn for moisture )     Recommendation: Clean area with chlorhexidine/ns. Pat dry. Apply Triad and place on an air flow pad daily      08/20/19 1146        Wound 08/16/19 2300 Diabetic Ulcer Calf #1   Date First Assessed/Time First Assessed: 08/16/19 2300   Pre-existing: Yes  Primary Wound Type: (c) Diabetic Ulcer  Side: Right  Location: Calf  Wound/PI Number (optional): #1   Wound Image       Dressing Appearance Clean   Drainage Amount Small   Drainage Characteristics/Odor Yellow   Appearance [place]   Periwound Area Denuded   Wound Edges Irregular   Wound Length (cm) 2 cm   Wound Width (cm) 3 cm   Wound Depth (cm) 0.8 cm   Wound Volume (cm^3) 4.8 cm^3   Wound Surface Area (cm^2) 6 cm^2   Care Cleansed with:;Antimicrobial agent;Applied:  (medihoney and cover with mepilex)     Recommendation: Clean with chlorhexidine/ns.  Pat dry. Apply Medihoney and cover with mepilex.

## 2019-08-20 NOTE — ASSESSMENT & PLAN NOTE
Was  in atrial fibrillation with RVR when pt came to ER  Currently in NSR   - on Eliquis for anticoagulation  - amiodarone on hold for now. Controlled on beta blockers

## 2019-08-20 NOTE — SUBJECTIVE & OBJECTIVE
Interval History: diuretics on hold    Review of Systems   Constitutional: Positive for fatigue. Negative for chills and fever.   HENT: Negative for congestion.    Respiratory: Negative for shortness of breath and wheezing.    Cardiovascular: Negative for chest pain and palpitations.   Gastrointestinal: Negative for constipation, diarrhea, nausea and vomiting.   Genitourinary: Negative for dysuria, flank pain, urgency and vaginal discharge.   Musculoskeletal: Negative for back pain and myalgias.     Objective:     Vital Signs (Most Recent):  Temp: 97.7 °F (36.5 °C) (08/20/19 1100)  Pulse: 61 (08/20/19 1100)  Resp: 18 (08/20/19 1100)  BP: (!) 109/52 (08/20/19 1100)  SpO2: 97 % (08/20/19 1100) Vital Signs (24h Range):  Temp:  [97.7 °F (36.5 °C)-98.5 °F (36.9 °C)] 97.7 °F (36.5 °C)  Pulse:  [61-70] 61  Resp:  [18-19] 18  SpO2:  [93 %-98 %] 97 %  BP: (100-120)/(47-59) 109/52     Weight: 87.1 kg (192 lb)  Body mass index is 36.28 kg/m².    Intake/Output Summary (Last 24 hours) at 8/20/2019 1419  Last data filed at 8/20/2019 0428  Gross per 24 hour   Intake 808.75 ml   Output 160 ml   Net 648.75 ml      Physical Exam   Constitutional: She is oriented to person, place, and time. No distress.   Eyes: Pupils are equal, round, and reactive to light. No scleral icterus.   Cardiovascular: Normal rate and regular rhythm.   No murmur heard.  2+ bilateral pedal edema   Pulmonary/Chest: She has no wheezes. She has rales in the right lower field and the left lower field.   Abdominal: Soft. She exhibits no distension. There is no tenderness.   Neurological: She is alert and oriented to person, place, and time.   Skin: Skin is warm. Capillary refill takes less than 2 seconds. No rash noted.   Psychiatric: She has a normal mood and affect.   Nursing note and vitals reviewed.      Significant Labs:   BMP:   Recent Labs   Lab 08/20/19  0405   *   *   K 3.5   CL 93*   CO2 22*   BUN 73*   CREATININE 2.4*   CALCIUM 7.9*   MG  1.9     CBC:   Recent Labs   Lab 08/19/19  0359 08/20/19  0405   WBC 7.40  7.40 7.43   HGB 8.6*  8.6* 8.9*   HCT 27.8*  27.8* 29.1*     152 177     CMP:   Recent Labs   Lab 08/19/19  0359 08/20/19  0405   *  130* 127*   K 4.0  4.0 3.5   CL 93*  93* 93*   CO2 23  23 22*   *  203* 157*   BUN 62*  62* 73*   CREATININE 2.1*  2.1* 2.4*   CALCIUM 8.5*  8.5* 7.9*   PROT 5.4*  5.4* 5.3*   ALBUMIN 2.1*  2.1* 2.1*   BILITOT 0.6  0.6 0.6   ALKPHOS 71  71 68   AST 27  27 16   ALT 31  31 23   ANIONGAP 14  14 12   EGFRNONAA 20.9*  20.9* 17.7*       Significant Imaging: I have reviewed all pertinent imaging results/findings within the past 24 hours.

## 2019-08-20 NOTE — PLAN OF CARE
Problem: Fall Injury Risk  Goal: Absence of Fall and Fall-Related Injury    Intervention: Promote Injury-Free Environment  Pt remains free from injury      Problem: Adult Inpatient Plan of Care  Goal: Absence of Hospital-Acquired Illness or Injury    Intervention: Prevent Skin Injury  PT turned fequentily. Pads applied to pressure sensitive areas     Goal: Optimal Comfort and Wellbeing    Intervention: Monitor Pain and Promote Comfort  No complaints of pain per shift      Problem: Infection  Goal: Infection Symptom Resolution    Intervention: Prevent or Manage Infection  Pt remains free from infection

## 2019-08-20 NOTE — PROGRESS NOTES
"[facility]  Adult Nutrition  Progress Note    SUMMARY       Recommendations    Recommendation/Intervention: 1. Continue current diet as tolerated, encourgae intake. 2. RD to monitor PO intake   Goals: 1. Pt will meet >50% of estimated energy and protein needs   Nutrition Goal Status: new    Reason for Assessment    Reason For Assessment: length of stay  Relevant Medical History: DM, HLD, HTN, throid disease  Interdisciplinary Rounds: attended    Nutrition Risk Screen    Nutrition Risk Screen: large or nonhealing wound, burn or pressure injury    Nutrition/Diet History    Spiritual, Cultural Beliefs, Gnosticist Practices, Values that Affect Care: yes(Pentecostal)  Food Allergies: NKFA    Anthropometrics    Temp: 97.7 °F (36.5 °C)  Height Method: Stated  Height: 5' 1" (154.9 cm)  Height (inches): 61 in  Weight Method: Bed Scale  Weight: 87.1 kg (192 lb)  Weight (lb): 192 lb  Ideal Body Weight (IBW), Female: 105 lb  % Ideal Body Weight, Female (lb): 182.86 lb  BMI (Calculated): 36.4  BMI Grade: 35 - 39.9 - obesity - grade II       Lab/Procedures/Meds    Pertinent Labs Reviewed: reviewed     8/20/2019 04:05   Sodium 127 (L)   Chloride 93 (L)   CO2 22 (L)   BUN, Bld 73 (H)   Creatinine 2.4 (H)   eGFR if non  17.7 (A)   eGFR if African American 20.5 (A)   Glucose 157 (H)   Calcium 7.9 (L)   PROTEIN TOTAL 5.3 (L)   Albumin 2.1 (L)     Hemoglobin 8.9 (L)   Hematocrit 29.1 (L)     Pertinent Medications Reviewed: reviewed  Scheduled Meds:   apixaban  5 mg Oral BID    aspirin  81 mg Oral QAM    cefTRIAXone (ROCEPHIN) IVPB  2 g Intravenous Q24H    clotrimazole-betamethasone 1-0.05%  1 g Topical (Top) BID    gabapentin  100 mg Oral TID    Lactobacillus acidoph-L.bulgar  1 tablet Oral TID WM    levothyroxine  150 mcg Oral QAM    metoprolol succinate  12.5 mg Oral Daily    pantoprazole  40 mg Oral Daily    polyethylene glycol  17 g Oral Daily    predniSONE  2 mg Oral TID    sertraline  50 mg " Oral QAM     Estimated/Assessed Needs    Weight Used For Calorie Calculations: 47.6 kg (105 lb)  Energy Calorie Requirements (kcal): 0439-9174 kcal/day (per IBW)     Protein Requirements:  g/day (per IBW)  Weight Used For Protein Calculations: 47.6 kg (105 lb)     Estimated Fluid Requirement Method: RDA Method  RDA Method (mL): 1190     Nutrition Prescription Ordered    Current Diet Order: 1800 Kcal Diabetic Diet     Evaluation of Received Nutrient/Fluid Intake    Energy Calories Required: not meeting needs  Protein Required: not meeting needs  Fluid Required: not meeting needs  Tolerance: tolerating  % Intake of Estimated Energy Needs: 50 - 75 %  % Meal Intake: 50 - 75 %    Nutrition Risk    Level of Risk/Frequency of Follow-up: high     Assessment and Plan    RD tried to meet with pt x 3 attempts. Discussed with RN patients appetite. RN stated pt has good appetite. Will continue to monitor PO intake and nutritional needs.      Monitor and Evaluation    Food and Nutrient Intake: energy intake, food and beverage intake  Food and Nutrient Adminstration: diet order  Physical Activity and Function: nutrition-related ADLs and IADLs  Anthropometric Measurements: weight, weight change  Biochemical Data, Medical Tests and Procedures: electrolyte and renal panel, inflammatory profile, lipid profile, glucose/endocrine profile, gastrointestinal profile  Nutrition-Focused Physical Findings: overall appearance     Nutrition Follow-Up    RD Follow-up?: Yes  Mery Perez  08/20/2019  1:10 PM

## 2019-08-20 NOTE — ASSESSMENT & PLAN NOTE
Gram negative sepsis - blood culture growing E.coli  On iv meropenem - will de-escalate antibiotics per culture sensitivities

## 2019-08-20 NOTE — PROGRESS NOTES
[facility]    Cardiology Progress Note    Subjective:  Seen and examined this morning. Telemetry pacing rhythm, no underlying A. Fib. Denies any active cardiac complaints but remains weak. Blood pressure stable but intermittently hypotensive. Nephrology on board, Cr today 2.4. Na 127. Hemoglobin 8.9.     Objective:  Vital Signs (Most Recent)  Temp: 97.8 °F (36.6 °C) (08/20/19 0747)  Pulse: 64 (08/20/19 0747)  Resp: 18 (08/20/19 0747)  BP: (!) 115/59 (08/20/19 0747)  SpO2: 98 % (08/20/19 0747)    Vital Signs Range (Last 24H):  Temp:  [97.7 °F (36.5 °C)-98.5 °F (36.9 °C)]   Pulse:  [61-70]   Resp:  [15-19]   BP: (100-120)/(47-59)   SpO2:  [93 %-98 %]     I & O (Last 24H):    Intake/Output Summary (Last 24 hours) at 8/20/2019 1059  Last data filed at 8/20/2019 0428  Gross per 24 hour   Intake 808.75 ml   Output 160 ml   Net 648.75 ml       Current Diet:     Current Diet Order   Procedures    Diet diabetic [facility]; 1800 Calorie; Standard Tray     Order Specific Question:   Indicate patient location for additional diet options:     Answer:   [facility]     Order Specific Question:   Total calories:     Answer:   1800 Calorie     Order Specific Question:   Tray type:     Answer:   Standard Tray        Allergies:  Review of patient's allergies indicates:  No Known Allergies    Meds:  Scheduled Meds:   amiodarone  200 mg Oral BID    apixaban  5 mg Oral BID    aspirin  81 mg Oral QAM    cefTRIAXone (ROCEPHIN) IVPB  2 g Intravenous Q24H    clotrimazole-betamethasone 1-0.05%  1 g Topical (Top) BID    gabapentin  100 mg Oral TID    Lactobacillus acidoph-L.bulgar  1 tablet Oral TID WM    levothyroxine  150 mcg Oral QAM    pantoprazole  40 mg Oral Daily    polyethylene glycol  17 g Oral Daily    predniSONE  2 mg Oral TID    sertraline  50 mg Oral QAM     Continuous Infusions:   sodium chloride 0.9% 75 mL/hr at 08/19/19 1205     PRN Meds:acetaminophen, bisacodyl, dextrose 50%, dextrose 50%, glucagon (human  recombinant), glucose, glucose, insulin aspart U-100, magnesium oxide, magnesium oxide, ondansetron, potassium chloride 10%, potassium chloride 10%, potassium chloride 10%, potassium, sodium phosphates, potassium, sodium phosphates, potassium, sodium phosphates, sodium chloride 0.9%, traMADol, trazodone    Lab Results :  Recent Results (from the past 24 hour(s))   POCT glucose    Collection Time: 08/19/19 11:01 AM   Result Value Ref Range    POC Glucose 215 (H) 70 - 110   Blood culture    Collection Time: 08/19/19  3:31 PM   Result Value Ref Range    Blood Culture, Routine No Growth to date    Sodium, urine, random    Collection Time: 08/19/19  3:46 PM   Result Value Ref Range    Sodium Urine Random <10 (L) 20 - 250 mmol/L   POCT glucose    Collection Time: 08/19/19  4:30 PM   Result Value Ref Range    POC Glucose 181 (H) 70 - 110   POCT glucose    Collection Time: 08/19/19 10:44 PM   Result Value Ref Range    POC Glucose 221 (H) 70 - 110   CBC auto differential    Collection Time: 08/20/19  4:05 AM   Result Value Ref Range    WBC 7.43 3.90 - 12.70 K/uL    RBC 3.03 (L) 4.00 - 5.40 M/uL    Hemoglobin 8.9 (L) 12.0 - 16.0 g/dL    Hematocrit 29.1 (L) 37.0 - 48.5 %    Mean Corpuscular Volume 96 82 - 98 fL    Mean Corpuscular Hemoglobin 29.4 27.0 - 31.0 pg    Mean Corpuscular Hemoglobin Conc 30.6 (L) 32.0 - 36.0 g/dL    RDW 17.3 (H) 11.5 - 14.5 %    Platelets 177 150 - 350 K/uL    MPV 11.0 9.2 - 12.9 fL    Immature Granulocytes 4.0 (H) 0.0 - 0.5 %    Gran # (ANC) 5.6 1.8 - 7.7 K/uL    Immature Grans (Abs) 0.30 (H) 0.00 - 0.04 K/uL    Lymph # 1.0 1.0 - 4.8 K/uL    Mono # 0.3 0.3 - 1.0 K/uL    Eos # 0.2 0.0 - 0.5 K/uL    Baso # 0.02 0.00 - 0.20 K/uL    nRBC 0 0 /100 WBC    Gran% 75.6 (H) 38.0 - 73.0 %    Lymph% 14.0 (L) 18.0 - 48.0 %    Mono% 3.9 (L) 4.0 - 15.0 %    Eosinophil% 2.2 0.0 - 8.0 %    Basophil% 0.3 0.0 - 1.9 %    Differential Method Automated    Comprehensive metabolic panel    Collection Time: 08/20/19  4:05 AM    Result Value Ref Range    Sodium 127 (L) 136 - 145 mmol/L    Potassium 3.5 3.5 - 5.1 mmol/L    Chloride 93 (L) 95 - 110 mmol/L    CO2 22 (L) 23 - 29 mmol/L    Glucose 157 (H) 70 - 110 mg/dL    BUN, Bld 73 (H) 8 - 23 mg/dL    Creatinine 2.4 (H) 0.5 - 1.4 mg/dL    Calcium 7.9 (L) 8.7 - 10.5 mg/dL    Total Protein 5.3 (L) 6.0 - 8.4 g/dL    Albumin 2.1 (L) 3.5 - 5.2 g/dL    Total Bilirubin 0.6 0.1 - 1.0 mg/dL    Alkaline Phosphatase 68 55 - 135 U/L    AST 16 10 - 40 U/L    ALT 23 10 - 44 U/L    Anion Gap 12 8 - 16 mmol/L    eGFR if African American 20.5 (A) >60 mL/min/1.73 m^2    eGFR if non  17.7 (A) >60 mL/min/1.73 m^2   Cortisol    Collection Time: 08/20/19  4:05 AM   Result Value Ref Range    Cortisol 5.2 ug/dL   Magnesium    Collection Time: 08/20/19  4:05 AM   Result Value Ref Range    Magnesium 1.9 1.6 - 2.6 mg/dL   POCT glucose    Collection Time: 08/20/19  5:41 AM   Result Value Ref Range    POC Glucose 174 (H) 70 - 110       Diagnostic Results:  Imaging Results          X-Ray Chest AP Portable (Final result)  Result time 08/16/19 17:44:51    Final result by Floyd Wells MD (08/16/19 17:44:51)                 Impression:      Left lung base pleuroparenchymal opacity could reflect airspace disease, atelectasis, pleural effusion, or some combination thereof.  Follow-up PA and lateral chest radiography may be helpful.      Electronically signed by: Floyd Wells MD  Date:    08/16/2019  Time:    17:44             Narrative:    EXAMINATION:  XR CHEST AP PORTABLE    CLINICAL HISTORY:  Sepsis;    COMPARISON:  06/05/2019    FINDINGS:  Cardiac silhouette size is enlarged.  Atherosclerotic calcification of the aorta.  Left subclavian pacing leads are in stable position.  There is pleuroparenchymal opacity at the left lung base.  Right lung is clear.  No pneumothorax.  No acute osseous abnormality.                                Physical Exam:  Objective:  General Appearance:  Comfortable.   "  Vital signs: (most recent): Blood pressure (!) 115/59, pulse 64, temperature 97.8 °F (36.6 °C), temperature source Oral, resp. rate 18, height 5' 1" (1.549 m), weight 87.3 kg (192 lb 7.4 oz), SpO2 98 %, not currently breastfeeding.    Lungs:  Normal effort and normal respiratory rate.  Breath sounds clear to auscultation.    Heart: Normal rate.  Regular rhythm.  S1 normal and S2 normal.  No gallop.   Abdomen: Abdomen is soft.  Bowel sounds are normal.     Extremities: There is no local swelling.        Current Consults:  IP CONSULT TO HOSPITAL MEDICINE  IP CONSULT TO CARDIOLOGY  WOUND CARE CONSULT  WOUND CARE CONSULT  IP CONSULT TO SOCIAL WORK/CASE MANAGEMENT  IP CONSULT TO INFECTIOUS DISEASES  IP CONSULT TO NEPHROLOGY    Assessment/Plan:  Assessment:   1. Urosepsis  2. Hypotension   3. Afib with rvr on Eliquis/Sotalol/Toprol --now in sinus  4. DM   5. Polymyalgia rheumatica   6. Encephalopathy   7. Moderate AS GIOVANNA 1.3 mean gradient 22 mmHg     Plan:   Discussion held with Dr. Mcfadden, will hold off on formal antiarrhythmics until antibiotic therapy complete for concern of prolongation of QT. In interim,will start low-dose Toprol. Continue to monitor telemetry.  Continue current plan of care per hospital medicine/nephrology. IV Lasix discontinued. Will order limited ECHO since patient remains hypotensive - low side of normotensive. Last study 03/19 demonstrating normal LVEF with mod AI.   Monitor hemoglobin, goal > 8.5 with AF.    Titus Landrum PA-C  08/20/19  "

## 2019-08-20 NOTE — PT/OT/SLP EVAL
"Occupational Therapy   Evaluation/Discharge    Name: Vee Pizano  MRN: [id number]  Admitting Diagnosis:  Sepsis      Recommendations:     Discharge Recommendations: nursing facility, basic vs home with 24/7 assist  Discharge Equipment Recommendations: none  Barriers to discharge:  Pt is dependent for care at baseline per chart review    Assessment:     Vee Pizano is a 86 y.o. female with a medical diagnosis of Sepsis.  At this time, pt is at functional baseline (dependent) for care.  She does not require continued skilled OT services.     Plan:     During this hospitalization, patient does not require further acute OT services.  Please re-consult if situation changes.      Subjective     Chief Complaint: pain "all over"  Patient/Family Comments/goals: to go home (pt goal); no family present    Occupational Profile:  Living Environment: Pt reports she lives in a single story home with no steps to enter  Previous level of function: pt reports "I can't remember"; per chart review pt is dependent for care   Roles and Routines: unknown  Equipment Used at Home:  (needs to be determined; previous documentation shows that pt may have a lift device and hospital bed at home; pt was unable to recall when asked today)  Assistance upon Discharge: unkown; spouse?    Pain/Comfort:  · Pain Rating 1: (pt reported pain "all over" but did not rate on scale)    Objective:     Communicated with: RN prior to session.  Patient found left sidelying with SCD, telemetry, peripheral IV, graves catheter upon OT entry to room.    General Precautions: Standard, fall   Orthopedic Precautions:N/A   Braces: N/A     Occupational Performance:    Bed Mobility:    · Patient completed Rolling/Turning to Left with  maximal assistance using handrail    Functional Mobility/Transfers:  · Attempted supine to sit with assist of one person; pt unable to achieve despite total A from OT    Activities of Daily Living:  · Lower Body Dressing: total assistance " bed level   · Toileting: total assistance bed level; pt initially presented with soiled linens; pt reports she was not aware she had a bowel movement in the bed; nurses aid assisted pt with bed level hygiene    Cognitive/Visual Perceptual:  Cognitive/Psychosocial Skills:     -       Oriented to: Person and Place   -       Follows Commands/attention:Follows one-step commands  -       Communication: clear/fluent  -       Memory: Impaired STM  -       Safety awareness/insight to disability: impaired   -       Mood/Affect/Coping skills/emotional control: Flat affect    Physical Exam:  Edema:  Severe BLE; Moderate BUE  Upper Extremity Range of Motion:     -       Right Upper Extremity: WFL  -       Left Upper Extremity: WFL  Upper Extremity Strength:    -       Right Upper Extremity: 3/5  -       Left Upper Extremity: 3/5   Strength:    -       Right Upper Extremity: fair-  -       Left Upper Extremity: fair-  Fine Motor Coordination:    -       Intact  Gross motor coordination:   WFL    AMPAC 6 Click ADL:  AMPAC Total Score: 8    Treatment & Education:  OT trial  Use of call light for assist with toileting (bed level)  Education:    Patient left HOB elevated with all lines intact, call button in reach, bed alarm on and RN notified of pt's c/o pain    GOALS:   None     History:     Past Medical History:   Diagnosis Date    Chronic atrial fibrillation     Diabetes mellitus     Hyperlipidemia     Hypertension     PMR (polymyalgia rheumatica)     Rheumatoid arthritis     Thyroid disease        Past Surgical History:   Procedure Laterality Date    APPENDECTOMY      BREAST BIOPSY Right     CARDIAC PACEMAKER PLACEMENT      CATARACT EXTRACTION Right     CHOLECYSTECTOMY      HYSTERECTOMY      JOINT REPLACEMENT Left     knee    TONSILLECTOMY         Time Tracking:     OT Date of Treatment: 08/20/19  OT Start Time: 1014  OT Stop Time: 1035  OT Total Time (min): 21 min    Billable Minutes:Evaluation 21    Yuri KEARNS  Zhane, OT  8/20/2019

## 2019-08-20 NOTE — PROGRESS NOTES
[facility] Medicine  Progress Note    Patient Name: Vee Pizano  MRN: [id number]  Patient Class: IP- Inpatient   Admission Date: 8/16/2019  Length of Stay: 4 days  Attending Physician: Yancy Castillo MD  Primary Care Provider: Noah Joseph MD        Subjective:     Principal Problem:Sepsis        HPI:  Mrs. Pizano is a 86-year-old female who presented to the ED via EMS with chief complaint of generalized weakness and foul-smelling urine.  Collateral was obtained from daughters who were present at bedside.  Reported yesterday patient with increased somnolence and minimal oral intake associated with increased confusion and generalized weakness.  Daughters state these symptoms usually occur when patient has urinary tract infection, has a history of recurrent UTIs.  Patient has chronic indwelling graves catheter, changed once monthly by home health nurse, unsure date of most recent change.  Low-grade fever at home at 99 as per home health nurse.  On EMS arrival blood pressure was low 80/40 with heart rates up to the 150s.  In the ED patient with atrial fibrillation with rapid ventricular response, received 1 L LR with improvement in blood pressure. She was started on IV amiodarone bolus and infusion as per protocol.  Graves was changed and UA/urine culture was sent in the ED.  She received IV meropenem for empiric coverage as well as 2 g IV magnesium as was noted to have frequent PVCs on telemetry. On my review patient reports generalized weakness and shortness of breath, 's, .    Overview/Hospital Course:  Patient much better  Patient's mental status is back to baseline  Blood culture growing Gram negative rods  Atrial fibrillation with RVR is now rate controlled  She denies any cough any cough or shortness of breath    Interval History: diuretics on hold    Review of Systems   Constitutional: Positive for fatigue. Negative for chills and fever.   HENT: Negative for  congestion.    Respiratory: Negative for shortness of breath and wheezing.    Cardiovascular: Negative for chest pain and palpitations.   Gastrointestinal: Negative for constipation, diarrhea, nausea and vomiting.   Genitourinary: Negative for dysuria, flank pain, urgency and vaginal discharge.   Musculoskeletal: Negative for back pain and myalgias.     Objective:     Vital Signs (Most Recent):  Temp: 97.7 °F (36.5 °C) (08/20/19 1100)  Pulse: 61 (08/20/19 1100)  Resp: 18 (08/20/19 1100)  BP: (!) 109/52 (08/20/19 1100)  SpO2: 97 % (08/20/19 1100) Vital Signs (24h Range):  Temp:  [97.7 °F (36.5 °C)-98.5 °F (36.9 °C)] 97.7 °F (36.5 °C)  Pulse:  [61-70] 61  Resp:  [18-19] 18  SpO2:  [93 %-98 %] 97 %  BP: (100-120)/(47-59) 109/52     Weight: 87.1 kg (192 lb)  Body mass index is 36.28 kg/m².    Intake/Output Summary (Last 24 hours) at 8/20/2019 1419  Last data filed at 8/20/2019 0428  Gross per 24 hour   Intake 808.75 ml   Output 160 ml   Net 648.75 ml      Physical Exam   Constitutional: She is oriented to person, place, and time. No distress.   Eyes: Pupils are equal, round, and reactive to light. No scleral icterus.   Cardiovascular: Normal rate and regular rhythm.   No murmur heard.  2+ bilateral pedal edema   Pulmonary/Chest: She has no wheezes. She has rales in the right lower field and the left lower field.   Abdominal: Soft. She exhibits no distension. There is no tenderness.   Neurological: She is alert and oriented to person, place, and time.   Skin: Skin is warm. Capillary refill takes less than 2 seconds. No rash noted.   Psychiatric: She has a normal mood and affect.   Nursing note and vitals reviewed.      Significant Labs:   BMP:   Recent Labs   Lab 08/20/19  0405   *   *   K 3.5   CL 93*   CO2 22*   BUN 73*   CREATININE 2.4*   CALCIUM 7.9*   MG 1.9     CBC:   Recent Labs   Lab 08/19/19  0359 08/20/19  0405   WBC 7.40  7.40 7.43   HGB 8.6*  8.6* 8.9*   HCT 27.8*  27.8* 29.1*     152  177     CMP:   Recent Labs   Lab 08/19/19  0359 08/20/19  0405   *  130* 127*   K 4.0  4.0 3.5   CL 93*  93* 93*   CO2 23  23 22*   *  203* 157*   BUN 62*  62* 73*   CREATININE 2.1*  2.1* 2.4*   CALCIUM 8.5*  8.5* 7.9*   PROT 5.4*  5.4* 5.3*   ALBUMIN 2.1*  2.1* 2.1*   BILITOT 0.6  0.6 0.6   ALKPHOS 71  71 68   AST 27  27 16   ALT 31  31 23   ANIONGAP 14  14 12   EGFRNONAA 20.9*  20.9* 17.7*       Significant Imaging: I have reviewed all pertinent imaging results/findings within the past 24 hours.      Assessment/Plan:      * Sepsis  Gram negative sepsis - blood culture growing E.coli  On iv meropenem - will de-escalate antibiotics per culture sensitivities        SUJEY (acute kidney injury)  Multifactorial:From Hypervolemia,Hypotension associated SUJEY/ATN  Start gentle IV fluid hydration at 75cc/hr    Atrial fibrillation with RVR  Was  in atrial fibrillation with RVR when pt came to ER  Currently in NSR   - on Eliquis for anticoagulation  - amiodarone on hold for now. Controlled on beta blockers      Paroxysmal atrial fibrillation with rapid ventricular response        Anasarca        Elevated troponin  Likely secondary to demand with sepsis as well as tachyarrhythmia.      Polymyalgia rheumatica  Patient on Long term steroids       Anemia        Advanced care planning/counseling discussion  Discussions with patient and 2 daughters present at bedside about code status.  Patient expressed wishes to be do not resuscitate, would want aggressive medical care but no chest compressions, intubation, life support.  Daughters in agreement stating aware low chances of successful resuscitation as well as current poor quality of life.      Chronic diastolic heart failure  Recent hospitalization for decompensated heart failure, EF of 60%.  She has chronic lower extremity swelling/3rd spacing.  Diuretics on hold due to worsening renal function      Sacral wound  Sacral wound, POA, likely in the setting  of chronic bed-bound status.  - wound care consult      Chronic venous stasis with right leg wound  Chronic venous stasis changes with wound to the right leg in the background of chronic leg edema, receiving local wound care.  - wound care consult      Generalized weakness on background of chronic debility  Multifactorial        UTI (urinary tract infection)  History of recurrent UTIs, has chronic indwelling graves catheter changed by home health nurse once per month.    Prior urine cultures with Pseudomonas, Proteus, Klebsiella.  Port of entry for gram negative sepsis from here      VTE Risk Mitigation (From admission, onward)        Ordered     apixaban tablet 5 mg  2 times daily      08/16/19 1806     IP VTE HIGH RISK PATIENT  Once      08/16/19 2056                Yancy Castillo MD  Department of Hospital Medicine   [facility]

## 2019-08-20 NOTE — PROGRESS NOTES
08/20/19 1146        Wound 08/16/19 2300 Other (comment) Buttocks #2   Date First Assessed/Time First Assessed: 08/16/19 2300   Pre-existing: Yes  Primary Wound Type: (c) Other (comment)  Side: (c) Left  Location: Buttocks  Wound/PI Number (optional): #2   Wound Image    Dressing Appearance Clean   Drainage Amount Scant   Drainage Characteristics/Odor Serosanguineous   Appearance [place]   Periwound Area Intact   Wound Edges Irregular   Care Cleansed with:;Antimicrobial agent;Other (see comments)  (apply [employer] and cover )

## 2019-08-20 NOTE — PROGRESS NOTES
INPATIENT NEPHROLOGY PROGRESS NOTE  [facility] NEPHROLOGY    Patient Name: Vee Pizano  Date: 08/20/2019    Reason for consultation: SUJEY    Chief Complaint:   Chief Complaint   Patient presents with    Weakness       History of Present Illness:  Mrs. Pizano is a 86-year-old female who presented to the ED via EMS with chief complaint of AMS, generalized weakness and foul-smelling urine. We are consulted for SUJEY.    · Interval History/Subjective:    - afebrile, SBP 110s, on RA, UOP 310cc    · Review of Systems: sleeping    Plan of Care:    Assessment:  UTI  SUJEY 2/2 hypotension, volume depletion and arrhythmia  Hypotension/hx of CHF  AF with RVR  Hyponatremia  HypoK  HypoMg  Anemia     Plan:     - She remains on IV antbx.  - Renal function is worse. Keep diuretics on hold. Hold NS IVFs. She appears oliguric. I really don't have a good grasp of intravascular volume status- would she be a RHC candidate??? I am hoping we aren't headed towards RRT- given advanced age and comorbid conditions, she would be a very poor candidate. No NSAIDs or IV contrast.  - She remains hypotensive- will get echo. Hold losartan/torsemide. AM cortisol is low- would consider bumping up dose of PO steroids.   - She is edematous but suspect intravascularly volume down, supported by low urina Na, but can also be c/w CHF- like I said, I really don't have good sense of volume status. Will basically keep off diuretics and IVFs and let her physiology declare itself. She is on low dose BB.   - She is rate controlled.  - Serum Na is worse- desalinating- dc'ed IVFs. Advise 1.5L fluid restriction.  - Ordered KCl repletion.  - She got IV Mg- better. Hold diuretics.   - Hb is stable. Iron studies are c/w CKD.    Thank you for allowing us to participate in this patient's care. We will continue to follow.    Medications:  No current facility-administered medications on file prior to encounter.      Current Outpatient Medications on File Prior to  Encounter   Medication Sig Dispense Refill    allopurinol (ZYLOPRIM) 100 MG tablet Take 100 mg by mouth 2 (two) times daily.       apixaban (ELIQUIS) 5 mg Tab Take 5 mg by mouth 2 (two) times daily.      aspirin 81 MG Chew Take 81 mg by mouth every morning.       clotrimazole-betamethasone 1-0.05% (LOTRISONE) cream Apply 1 g topically 2 (two) times daily.      esomeprazole (NEXIUM) 40 MG capsule Take 40 mg by mouth every morning.       gabapentin (NEURONTIN) 100 MG capsule Take 1 capsule (100 mg total) by mouth 3 (three) times daily. 90 capsule 3    levothyroxine (SYNTHROID) 150 MCG tablet Take 150 mcg by mouth every morning.       losartan (COZAAR) 50 MG tablet Take 50 mg by mouth every morning.       metoprolol succinate (TOPROL-XL) 50 MG 24 hr tablet Take 50 mg by mouth every evening.      potassium chloride SA (K-DUR,KLOR-CON) 20 MEQ tablet Take 40 mEq by mouth 2 (two) times daily.      predniSONE (DELTASONE) 2.5 MG tablet Take 1 tablet (2.5 mg total) by mouth 3 (three) times daily. NEEDS APPT (Patient taking differently: Take 2.5 mg by mouth 3 (three) times daily. ) 90 tablet 1    sertraline (ZOLOFT) 50 MG tablet Take 50 mg by mouth every morning.      sotalol (BETAPACE) 80 MG tablet Take 80 mg by mouth 2 (two) times daily.       torsemide (DEMADEX) 20 MG Tab Take 20 mg by mouth 2 (two) times daily.      VITAMIN D2 50,000 unit capsule Take 50,000 Units by mouth every 7 days. ON MONDAYS      LANTUS U-100 INSULIN 100 unit/mL injection Inject 10 Units into the skin daily as needed.       NOVOLOG FLEXPEN U-100 INSULIN 100 unit/mL InPn pen Inject 5 Units into the skin 3 (three) times daily as needed (per sliding scale, hold for BGL<150).       traMADol (ULTRAM) 50 mg tablet Take 50 mg by mouth every 4 (four) hours as needed.        Scheduled Meds:   apixaban  5 mg Oral BID    aspirin  81 mg Oral QAM    cefTRIAXone (ROCEPHIN) IVPB  2 g Intravenous Q24H    clotrimazole-betamethasone 1-0.05%  1 g  Topical (Top) BID    gabapentin  100 mg Oral TID    Lactobacillus acidoph-L.bulgar  1 tablet Oral TID WM    levothyroxine  150 mcg Oral QAM    metoprolol succinate  12.5 mg Oral Daily    pantoprazole  40 mg Oral Daily    polyethylene glycol  17 g Oral Daily    predniSONE  2 mg Oral TID    sertraline  50 mg Oral QAM     Continuous Infusions:   sodium chloride 0.9% 75 mL/hr at 08/20/19 1254     PRN Meds:.acetaminophen, bisacodyl, dextrose 50%, dextrose 50%, glucagon (human recombinant), glucose, glucose, insulin aspart U-100, magnesium oxide, magnesium oxide, ondansetron, potassium chloride 10%, potassium chloride 10%, potassium chloride 10%, potassium, sodium phosphates, potassium, sodium phosphates, potassium, sodium phosphates, sodium chloride 0.9%, traMADol, trazodone    Allergies:  Patient has no known allergies.    Vital Signs:  Temp Readings from Last 3 Encounters:   08/20/19 97.7 °F (36.5 °C) (Oral)   04/03/18 99.2 °F (37.3 °C) (Oral)   06/08/13 99.3 °F (37.4 °C) (Oral)       Pulse Readings from Last 3 Encounters:   08/20/19 61   04/03/18 77   06/08/13 73       BP Readings from Last 3 Encounters:   08/20/19 (!) 109/52   04/03/18 124/63   03/26/18 100/62       Weight:  Wt Readings from Last 3 Encounters:   08/20/19 87.1 kg (192 lb)   04/03/18 70.3 kg (155 lb)   03/26/18 69.9 kg (154 lb)       INS/OUTS:  I/O last 3 completed shifts:  In: 808.8 [P.O.:240; I.V.:518.8; IV Piggyback:50]  Out: 310 [Urine:310]  No intake/output data recorded.    Physical Exam:  Constitutional: nad, sleeping, acute/chronically ill, appears stated age  Heart: irreg irreg, no r/g, wwp, anasarca  Lungs: ant ausc coarse, no w/r/r/c, no lb  Abdomen: s/nt/d, +BS    Results:  Lab Results   Component Value Date     (L) 08/20/2019    K 3.5 08/20/2019    CL 93 (L) 08/20/2019    CO2 22 (L) 08/20/2019    BUN 73 (H) 08/20/2019    CREATININE 2.4 (H) 08/20/2019    CALCIUM 7.9 (L) 08/20/2019    ANIONGAP 12 08/20/2019    ESTGFRAFRICA  20.5 (A) 08/20/2019    EGFRNONAA 17.7 (A) 08/20/2019       Lab Results   Component Value Date    CALCIUM 7.9 (L) 08/20/2019    PHOS 4.0 08/17/2019       Recent Labs   Lab 08/20/19  0405   WBC 7.43   RBC 3.03*   HGB 8.9*   HCT 29.1*      MCV 96   MCH 29.4   MCHC 30.6*       I have personally reviewed pertinent radiological imaging and reports.    Pranay Flores MD MPH  [facility]  [address] [place] [id number]  [phone] (p)  [phone] (f)

## 2019-08-21 PROBLEM — R78.81 GRAM-NEGATIVE BACTEREMIA: Status: ACTIVE | Noted: 2019-08-21

## 2019-08-21 PROBLEM — E87.1 HYPONATREMIA: Status: ACTIVE | Noted: 2019-08-21

## 2019-08-21 PROBLEM — A41.9 SEPSIS: Status: RESOLVED | Noted: 2019-08-16 | Resolved: 2019-08-21

## 2019-08-21 LAB
ALBUMIN SERPL BCP-MCNC: 2.3 G/DL (ref 3.5–5.2)
ALP SERPL-CCNC: 69 U/L (ref 55–135)
ALT SERPL W/O P-5'-P-CCNC: 18 U/L (ref 10–44)
ANION GAP SERPL CALC-SCNC: 12 MMOL/L (ref 8–16)
AST SERPL-CCNC: 13 U/L (ref 10–40)
BACTERIA BLD CULT: NORMAL
BASOPHILS NFR BLD: 0 % (ref 0–1.9)
BILIRUB SERPL-MCNC: 0.6 MG/DL (ref 0.1–1)
BUN SERPL-MCNC: 72 MG/DL (ref 8–23)
CALCIUM SERPL-MCNC: 8.2 MG/DL (ref 8.7–10.5)
CHLORIDE SERPL-SCNC: 94 MMOL/L (ref 95–110)
CO2 SERPL-SCNC: 23 MMOL/L (ref 23–29)
CREAT SERPL-MCNC: 2 MG/DL (ref 0.5–1.4)
DIFFERENTIAL METHOD: ABNORMAL
EOSINOPHIL NFR BLD: 0 % (ref 0–8)
ERYTHROCYTE [DISTWIDTH] IN BLOOD BY AUTOMATED COUNT: 17.3 % (ref 11.5–14.5)
EST. GFR  (AFRICAN AMERICAN): 25.5 ML/MIN/1.73 M^2
EST. GFR  (NON AFRICAN AMERICAN): 22.1 ML/MIN/1.73 M^2
GLUCOSE SERPL-MCNC: 164 MG/DL (ref 70–110)
GLUCOSE SERPL-MCNC: 186 MG/DL (ref 70–110)
GLUCOSE SERPL-MCNC: 191 MG/DL (ref 70–110)
GLUCOSE SERPL-MCNC: 198 MG/DL (ref 70–110)
GLUCOSE SERPL-MCNC: 201 MG/DL (ref 70–110)
HCT VFR BLD AUTO: 29.4 % (ref 37–48.5)
HGB BLD-MCNC: 9.1 G/DL (ref 12–16)
IMM GRANULOCYTES # BLD AUTO: ABNORMAL K/UL (ref 0–0.04)
IMM GRANULOCYTES NFR BLD AUTO: ABNORMAL % (ref 0–0.5)
LYMPHOCYTES NFR BLD: 6 % (ref 18–48)
MCH RBC QN AUTO: 29.5 PG (ref 27–31)
MCHC RBC AUTO-ENTMCNC: 31 G/DL (ref 32–36)
MCV RBC AUTO: 96 FL (ref 82–98)
MONOCYTES NFR BLD: 1 % (ref 4–15)
NEUTROPHILS NFR BLD: 92 % (ref 38–73)
NEUTS BAND NFR BLD MANUAL: 1 %
NRBC BLD-RTO: 0 /100 WBC
PLATELET # BLD AUTO: 207 K/UL (ref 150–350)
PMV BLD AUTO: 10.5 FL (ref 9.2–12.9)
POTASSIUM SERPL-SCNC: 4.3 MMOL/L (ref 3.5–5.1)
PROT SERPL-MCNC: 5.8 G/DL (ref 6–8.4)
RBC # BLD AUTO: 3.08 M/UL (ref 4–5.4)
SODIUM SERPL-SCNC: 129 MMOL/L (ref 136–145)
TSH SERPL DL<=0.005 MIU/L-ACNC: 0.74 UIU/ML (ref 0.34–5.6)
WBC # BLD AUTO: 6.91 K/UL (ref 3.9–12.7)

## 2019-08-21 PROCEDURE — 85007 BL SMEAR W/DIFF WBC COUNT: CPT

## 2019-08-21 PROCEDURE — 84443 ASSAY THYROID STIM HORMONE: CPT

## 2019-08-21 PROCEDURE — 25000003 PHARM REV CODE 250: Performed by: PHYSICIAN ASSISTANT

## 2019-08-21 PROCEDURE — 21400001 HC TELEMETRY ROOM

## 2019-08-21 PROCEDURE — 85027 COMPLETE CBC AUTOMATED: CPT

## 2019-08-21 PROCEDURE — 63600175 PHARM REV CODE 636 W HCPCS: Performed by: INTERNAL MEDICINE

## 2019-08-21 PROCEDURE — 80053 COMPREHEN METABOLIC PANEL: CPT

## 2019-08-21 PROCEDURE — 94761 N-INVAS EAR/PLS OXIMETRY MLT: CPT

## 2019-08-21 PROCEDURE — 36415 COLL VENOUS BLD VENIPUNCTURE: CPT

## 2019-08-21 PROCEDURE — 25000003 PHARM REV CODE 250: Performed by: INTERNAL MEDICINE

## 2019-08-21 RX ORDER — METOPROLOL SUCCINATE 25 MG/1
25 TABLET, EXTENDED RELEASE ORAL DAILY
Status: DISCONTINUED | OUTPATIENT
Start: 2019-08-22 | End: 2019-08-23 | Stop reason: HOSPADM

## 2019-08-21 RX ADMIN — INSULIN ASPART 4 UNITS: 100 INJECTION, SOLUTION INTRAVENOUS; SUBCUTANEOUS at 05:08

## 2019-08-21 RX ADMIN — APIXABAN 5 MG: 5 TABLET, FILM COATED ORAL at 08:08

## 2019-08-21 RX ADMIN — PREDNISONE 2 MG: 1 TABLET ORAL at 09:08

## 2019-08-21 RX ADMIN — SERTRALINE HYDROCHLORIDE 50 MG: 50 TABLET ORAL at 06:08

## 2019-08-21 RX ADMIN — CEFTRIAXONE 2 G: 2 INJECTION, SOLUTION INTRAVENOUS at 02:08

## 2019-08-21 RX ADMIN — INSULIN ASPART 2 UNITS: 100 INJECTION, SOLUTION INTRAVENOUS; SUBCUTANEOUS at 08:08

## 2019-08-21 RX ADMIN — LEVOTHYROXINE SODIUM 150 MCG: 0.03 TABLET ORAL at 06:08

## 2019-08-21 RX ADMIN — LACTOBACILLUS TAB 1 TABLET: TAB at 05:08

## 2019-08-21 RX ADMIN — CLOTRIMAZOLE AND BETAMETHASONE DIPROPIONATE 1 G: 10; .5 CREAM TOPICAL at 08:08

## 2019-08-21 RX ADMIN — APIXABAN 5 MG: 5 TABLET, FILM COATED ORAL at 09:08

## 2019-08-21 RX ADMIN — INSULIN ASPART 2 UNITS: 100 INJECTION, SOLUTION INTRAVENOUS; SUBCUTANEOUS at 12:08

## 2019-08-21 RX ADMIN — PREDNISONE 2 MG: 1 TABLET ORAL at 08:08

## 2019-08-21 RX ADMIN — TRAZODONE HYDROCHLORIDE 25 MG: 50 TABLET ORAL at 08:08

## 2019-08-21 RX ADMIN — CLOTRIMAZOLE AND BETAMETHASONE DIPROPIONATE 1 G: 10; .5 CREAM TOPICAL at 09:08

## 2019-08-21 RX ADMIN — GABAPENTIN 100 MG: 100 CAPSULE ORAL at 09:08

## 2019-08-21 RX ADMIN — PANTOPRAZOLE SODIUM 40 MG: 40 TABLET, DELAYED RELEASE ORAL at 06:08

## 2019-08-21 RX ADMIN — EPOETIN ALFA-EPBX 4400 UNITS: 10000 INJECTION, SOLUTION INTRAVENOUS; SUBCUTANEOUS at 01:08

## 2019-08-21 RX ADMIN — ASPIRIN 81 MG 81 MG: 81 TABLET ORAL at 09:08

## 2019-08-21 RX ADMIN — PREDNISONE 2 MG: 1 TABLET ORAL at 02:08

## 2019-08-21 RX ADMIN — LACTOBACILLUS TAB 1 TABLET: TAB at 01:08

## 2019-08-21 RX ADMIN — GABAPENTIN 100 MG: 100 CAPSULE ORAL at 08:08

## 2019-08-21 RX ADMIN — METOPROLOL SUCCINATE 12.5 MG: 25 TABLET, EXTENDED RELEASE ORAL at 09:08

## 2019-08-21 RX ADMIN — LACTOBACILLUS TAB 1 TABLET: TAB at 09:08

## 2019-08-21 RX ADMIN — GABAPENTIN 100 MG: 100 CAPSULE ORAL at 02:08

## 2019-08-21 NOTE — PROGRESS NOTES
[facility] Medicine  Progress Note    Patient Name: Vee Pizano  MRN: [id number]  Patient Class: IP- Inpatient   Admission Date: 8/16/2019  Length of Stay: 5 days  Attending Physician: Yancy Castillo MD  Primary Care Provider: Noah Joseph MD        Subjective:     Principal Problem:Sepsis        HPI:  Mrs. Pizano is a 86-year-old female who presented to the ED via EMS with chief complaint of generalized weakness and foul-smelling urine.  Collateral was obtained from daughters who were present at bedside.  Reported yesterday patient with increased somnolence and minimal oral intake associated with increased confusion and generalized weakness.  Daughters state these symptoms usually occur when patient has urinary tract infection, has a history of recurrent UTIs.  Patient has chronic indwelling graves catheter, changed once monthly by home health nurse, unsure date of most recent change.  Low-grade fever at home at 99 as per home health nurse.  On EMS arrival blood pressure was low 80/40 with heart rates up to the 150s.  In the ED patient with atrial fibrillation with rapid ventricular response, received 1 L LR with improvement in blood pressure. She was started on IV amiodarone bolus and infusion as per protocol.  Graves was changed and UA/urine culture was sent in the ED.  She received IV meropenem for empiric coverage as well as 2 g IV magnesium as was noted to have frequent PVCs on telemetry. On my review patient reports generalized weakness and shortness of breath, 's, .    Overview/Hospital Course:  Patient much better  Patient's mental status is back to baseline  Blood culture growing Gram negative rods  Atrial fibrillation with RVR is now rate controlled  She denies any cough any cough or shortness of breath    Interval History: diuretics were stopped yesterday    Review of Systems   Constitutional: Negative for chills and fever.   HENT: Positive for facial  swelling. Negative for congestion.    Respiratory: Negative for shortness of breath and wheezing.    Cardiovascular: Negative for chest pain and palpitations.   Gastrointestinal: Negative for constipation, diarrhea, nausea and vomiting.   Genitourinary: Negative for dysuria, flank pain, urgency and vaginal discharge.   Musculoskeletal: Negative for back pain and myalgias.     Objective:     Vital Signs (Most Recent):  Temp: 98.4 °F (36.9 °C) (08/21/19 1134)  Pulse: 63 (08/21/19 1134)  Resp: 18 (08/21/19 1134)  BP: (!) 112/51 (08/21/19 1134)  SpO2: 95 % (08/21/19 1134) Vital Signs (24h Range):  Temp:  [97.5 °F (36.4 °C)-98.4 °F (36.9 °C)] 98.4 °F (36.9 °C)  Pulse:  [60-80] 63  Resp:  [18-19] 18  SpO2:  [95 %-98 %] 95 %  BP: (110-145)/(38-61) 112/51     Weight: 87.1 kg (192 lb)  Body mass index is 36.28 kg/m².    Intake/Output Summary (Last 24 hours) at 8/21/2019 1259  Last data filed at 8/21/2019 0833  Gross per 24 hour   Intake 360 ml   Output --   Net 360 ml      Physical Exam   Constitutional: She is oriented to person, place, and time. No distress.   Eyes: Pupils are equal, round, and reactive to light. No scleral icterus.   Cardiovascular: Normal rate and regular rhythm.   No murmur heard.  2+ bilateral pedal edema   Pulmonary/Chest: She has no wheezes. She has rales in the right lower field and the left lower field.   Abdominal: Soft. She exhibits no distension. There is no tenderness.   Neurological: She is alert and oriented to person, place, and time.   Skin: Skin is warm. Capillary refill takes less than 2 seconds. No rash noted.   Psychiatric: She has a normal mood and affect.   Nursing note and vitals reviewed.      Significant Labs:   BMP:   Recent Labs   Lab 08/20/19  0405 08/21/19  0415   * 191*   * 129*   K 3.5 4.3   CL 93* 94*   CO2 22* 23   BUN 73* 72*   CREATININE 2.4* 2.0*   CALCIUM 7.9* 8.2*   MG 1.9  --      CBC:   Recent Labs   Lab 08/20/19  0405 08/21/19  0415   WBC 7.43 6.91    HGB 8.9* 9.1*   HCT 29.1* 29.4*    207     CMP:   Recent Labs   Lab 08/20/19  0405 08/21/19  0415   * 129*   K 3.5 4.3   CL 93* 94*   CO2 22* 23   * 191*   BUN 73* 72*   CREATININE 2.4* 2.0*   CALCIUM 7.9* 8.2*   PROT 5.3* 5.8*   ALBUMIN 2.1* 2.3*   BILITOT 0.6 0.6   ALKPHOS 68 69   AST 16 13   ALT 23 18   ANIONGAP 12 12   EGFRNONAA 17.7* 22.1*       Significant Imaging: I have reviewed all pertinent imaging results/findings within the past 24 hours.      Assessment/Plan:      SUJEY (acute kidney injury)  Multifactorial:From Hypervolemia,Hypotension associated SUJEY/ATN  Improving.  Nephrology on board, appreciate recs    Gram-negative bacteremia  Continue IV ceftriaxone   Will switch to oral fluoroquinolone on discharge      UTI (urinary tract infection)  History of recurrent UTIs, has chronic indwelling graves catheter changed by home health nurse once per month.    Prior urine cultures with Pseudomonas, Proteus, Klebsiella.  Port of entry for gram negative sepsis from here    Atrial fibrillation with RVR  Was  in atrial fibrillation with RVR when pt came to ER  Currently in NSR   - on Eliquis for anticoagulation  - amiodarone on hold for now. Controlled on beta blockers      Hyponatremia  Improving  Continue IV hydration and monitor BMP      Chronic diastolic heart failure  Recent hospitalization for decompensated heart failure, EF of 60%.  She has chronic lower extremity swelling/3rd spacing.  Diuretics on hold due to worsening renal function      Paroxysmal atrial fibrillation with rapid ventricular response        Anasarca        Elevated troponin  Likely secondary to demand with sepsis as well as tachyarrhythmia.      Polymyalgia rheumatica  Patient on Long term steroids       Anemia        Advanced care planning/counseling discussion  Discussions with patient and 2 daughters present at bedside about code status.  Patient expressed wishes to be do not resuscitate, would want aggressive  medical care but no chest compressions, intubation, life support.  Daughters in agreement stating aware low chances of successful resuscitation as well as current poor quality of life.      Sacral wound  Sacral wound, POA, likely in the setting of chronic bed-bound status.  - wound care consult      Chronic venous stasis with right leg wound  Chronic venous stasis changes with wound to the right leg in the background of chronic leg edema, receiving local wound care.  - wound care consult      Generalized weakness on background of chronic debility  Multifactorial          VTE Risk Mitigation (From admission, onward)        Ordered     apixaban tablet 5 mg  2 times daily      08/16/19 1806     IP VTE HIGH RISK PATIENT  Once      08/16/19 2056                Yancy Castillo MD  Department of Hospital Medicine   [facility]

## 2019-08-21 NOTE — PROGRESS NOTES
INPATIENT NEPHROLOGY PROGRESS NOTE  [facility] NEPHROLOGY    Patient Name: Vee Pizano  Date: 08/21/2019    Reason for consultation: SUJEY    Chief Complaint:   Chief Complaint   Patient presents with    Weakness       History of Present Illness:  Mrs. Pizano is a 86-year-old female who presented to the ED via EMS with chief complaint of AMS, generalized weakness and foul-smelling urine. We are consulted for SUJEY.    · Interval History/Subjective:    - afebrile, -140, better, on RA, UOP not recorded  - wants to go home  - no cp, dyspnea, abd pain; c/w anasarca    · Review of Systems: sleeping    Plan of Care:    Assessment:  UTI  SUJEY 2/2 hypotension, volume depletion and arrhythmia  Hypotension/hx of CHF/Anasarca  AF with RVR  Hyponatremia  HypoK  Anemia     Plan:     - She remains on IV antbx.  - Cr peaked yest at 2.4 and is now downtrending. Keep diuretics and IVFs on hold. I really don't have a good grasp of intravascular volume status but by holding everything, BP is improving as is renal function so will continue to monitor. She would be a very poor candidate for RRT. No NSAIDs or IV contrast.  - BP is better. VS is stable. Still advise echo. Hold losartan/torsemide.    - She is rate controlled.  - Serum Na is better. Continue 1.5L fluid restriction.  - HypoK is resolved after KCl repletion yest.  - Hb is stable. Iron studies are c/w CKD. Ordered procrit 50 units/kg 3x per week.     Discussed renal plan with hospitalist.     Thank you for allowing us to participate in this patient's care. We will continue to follow.    Medications:  No current facility-administered medications on file prior to encounter.      Current Outpatient Medications on File Prior to Encounter   Medication Sig Dispense Refill    allopurinol (ZYLOPRIM) 100 MG tablet Take 100 mg by mouth 2 (two) times daily.       apixaban (ELIQUIS) 5 mg Tab Take 5 mg by mouth 2 (two) times daily.      aspirin 81 MG Chew Take 81 mg by mouth  every morning.       clotrimazole-betamethasone 1-0.05% (LOTRISONE) cream Apply 1 g topically 2 (two) times daily.      esomeprazole (NEXIUM) 40 MG capsule Take 40 mg by mouth every morning.       gabapentin (NEURONTIN) 100 MG capsule Take 1 capsule (100 mg total) by mouth 3 (three) times daily. 90 capsule 3    levothyroxine (SYNTHROID) 150 MCG tablet Take 150 mcg by mouth every morning.       losartan (COZAAR) 50 MG tablet Take 50 mg by mouth every morning.       metoprolol succinate (TOPROL-XL) 50 MG 24 hr tablet Take 50 mg by mouth every evening.      potassium chloride SA (K-DUR,KLOR-CON) 20 MEQ tablet Take 40 mEq by mouth 2 (two) times daily.      predniSONE (DELTASONE) 2.5 MG tablet Take 1 tablet (2.5 mg total) by mouth 3 (three) times daily. NEEDS APPT (Patient taking differently: Take 2.5 mg by mouth 3 (three) times daily. ) 90 tablet 1    sertraline (ZOLOFT) 50 MG tablet Take 50 mg by mouth every morning.      sotalol (BETAPACE) 80 MG tablet Take 80 mg by mouth 2 (two) times daily.       torsemide (DEMADEX) 20 MG Tab Take 20 mg by mouth 2 (two) times daily.      VITAMIN D2 50,000 unit capsule Take 50,000 Units by mouth every 7 days. ON MONDAYS      LANTUS U-100 INSULIN 100 unit/mL injection Inject 10 Units into the skin daily as needed.       NOVOLOG FLEXPEN U-100 INSULIN 100 unit/mL InPn pen Inject 5 Units into the skin 3 (three) times daily as needed (per sliding scale, hold for BGL<150).       traMADol (ULTRAM) 50 mg tablet Take 50 mg by mouth every 4 (four) hours as needed.        Scheduled Meds:   apixaban  5 mg Oral BID    aspirin  81 mg Oral QAM    cefTRIAXone (ROCEPHIN) IVPB  2 g Intravenous Q24H    clotrimazole-betamethasone 1-0.05%  1 g Topical (Top) BID    gabapentin  100 mg Oral TID    Lactobacillus acidoph-L.bulgar  1 tablet Oral TID WM    levothyroxine  150 mcg Oral QAM    metoprolol succinate  12.5 mg Oral Daily    pantoprazole  40 mg Oral Daily    polyethylene glycol   17 g Oral Daily    predniSONE  2 mg Oral TID    sertraline  50 mg Oral QAM     Continuous Infusions:    PRN Meds:.acetaminophen, bisacodyl, dextrose 50%, dextrose 50%, glucagon (human recombinant), glucose, glucose, insulin aspart U-100, magnesium oxide, magnesium oxide, ondansetron, potassium chloride 10%, potassium chloride 10%, potassium chloride 10%, potassium, sodium phosphates, potassium, sodium phosphates, potassium, sodium phosphates, sodium chloride 0.9%, traMADol, trazodone    Allergies:  Patient has no known allergies.    Vital Signs:  Temp Readings from Last 3 Encounters:   08/21/19 98 °F (36.7 °C) (Oral)   04/03/18 99.2 °F (37.3 °C) (Oral)   06/08/13 99.3 °F (37.4 °C) (Oral)       Pulse Readings from Last 3 Encounters:   08/21/19 78   04/03/18 77   06/08/13 73       BP Readings from Last 3 Encounters:   08/21/19 (!) 145/61   04/03/18 124/63   03/26/18 100/62       Weight:  Wt Readings from Last 3 Encounters:   08/20/19 87.1 kg (192 lb)   04/03/18 70.3 kg (155 lb)   03/26/18 69.9 kg (154 lb)       INS/OUTS:  I/O last 3 completed shifts:  In: -   Out: 160 [Urine:160]  I/O this shift:  In: 360 [P.O.:360]  Out: -     Physical Exam:  Constitutional: nad, aao x 3  Heart: irreg irreg, no r/g, wwp, anasarca  Lungs: ant ausc coarse, no w/r/r/c, no lb  Abdomen: s/nt/d, +BS    Results:  Lab Results   Component Value Date     (L) 08/21/2019    K 4.3 08/21/2019    CL 94 (L) 08/21/2019    CO2 23 08/21/2019    BUN 72 (H) 08/21/2019    CREATININE 2.0 (H) 08/21/2019    CALCIUM 8.2 (L) 08/21/2019    ANIONGAP 12 08/21/2019    ESTGFRAFRICA 25.5 (A) 08/21/2019    EGFRNONAA 22.1 (A) 08/21/2019       Lab Results   Component Value Date    CALCIUM 8.2 (L) 08/21/2019    PHOS 4.0 08/17/2019       Recent Labs   Lab 08/21/19  0415   WBC 6.91   RBC 3.08*   HGB 9.1*   HCT 29.4*      MCV 96   MCH 29.5   MCHC 31.0*       I have personally reviewed pertinent radiological imaging and reports.    Pranay Flores MD  MPH  [facility] [place]  [address] [address]  [phone] (p)  [phone] (f)

## 2019-08-21 NOTE — PROGRESS NOTES
[facility]    Cardiology Progress Note    Subjective:  Seen and examined this morning. Telemetry pacing rhythm, no underlying A. Fib. Denies any active cardiac complaints, desires to go home. Blood pressure more stable today. Cr improved from 2.4 to 2.0. Na now 129. ECHO order yesterday fell off, study not done.     Objective:  Vital Signs (Most Recent)  Temp: 98 °F (36.7 °C) (08/21/19 0720)  Pulse: 78 (08/21/19 0817)  Resp: 18 (08/21/19 0817)  BP: (!) 145/61 (08/21/19 0720)  SpO2: 96 % (08/21/19 0817)    Vital Signs Range (Last 24H):  Temp:  [97.5 °F (36.4 °C)-98 °F (36.7 °C)]   Pulse:  [60-80]   Resp:  [18-19]   BP: (109-145)/(38-61)   SpO2:  [95 %-98 %]     I & O (Last 24H):    Intake/Output Summary (Last 24 hours) at 8/21/2019 0955  Last data filed at 8/21/2019 0833  Gross per 24 hour   Intake 360 ml   Output --   Net 360 ml       Current Diet:     Current Diet Order   Procedures    Diet diabetic [facility]; 1800 Calorie; Standard Tray     Order Specific Question:   Indicate patient location for additional diet options:     Answer:   [facility]     Order Specific Question:   Total calories:     Answer:   1800 Calorie     Order Specific Question:   Tray type:     Answer:   Standard Tray        Allergies:  Review of patient's allergies indicates:  No Known Allergies    Meds:  Scheduled Meds:   apixaban  5 mg Oral BID    aspirin  81 mg Oral QAM    cefTRIAXone (ROCEPHIN) IVPB  2 g Intravenous Q24H    clotrimazole-betamethasone 1-0.05%  1 g Topical (Top) BID    gabapentin  100 mg Oral TID    Lactobacillus acidoph-L.bulgar  1 tablet Oral TID WM    levothyroxine  150 mcg Oral QAM    metoprolol succinate  12.5 mg Oral Daily    pantoprazole  40 mg Oral Daily    polyethylene glycol  17 g Oral Daily    predniSONE  2 mg Oral TID    sertraline  50 mg Oral QAM     Continuous Infusions:    PRN Meds:acetaminophen, bisacodyl, dextrose 50%, dextrose 50%, glucagon (human recombinant), glucose, glucose, insulin aspart  U-100, magnesium oxide, magnesium oxide, ondansetron, potassium chloride 10%, potassium chloride 10%, potassium chloride 10%, potassium, sodium phosphates, potassium, sodium phosphates, potassium, sodium phosphates, sodium chloride 0.9%, traMADol, trazodone    Lab Results :  Recent Results (from the past 24 hour(s))   POCT glucose    Collection Time: 08/20/19 11:54 AM   Result Value Ref Range    POC Glucose 174 (H) 70 - 110   POCT glucose    Collection Time: 08/20/19  4:25 PM   Result Value Ref Range    POC Glucose 151 (H) 70 - 110   POCT glucose    Collection Time: 08/20/19  8:23 PM   Result Value Ref Range    POC Glucose 202 (H) 70 - 110   CBC auto differential    Collection Time: 08/21/19  4:15 AM   Result Value Ref Range    WBC 6.91 3.90 - 12.70 K/uL    RBC 3.08 (L) 4.00 - 5.40 M/uL    Hemoglobin 9.1 (L) 12.0 - 16.0 g/dL    Hematocrit 29.4 (L) 37.0 - 48.5 %    Mean Corpuscular Volume 96 82 - 98 fL    Mean Corpuscular Hemoglobin 29.5 27.0 - 31.0 pg    Mean Corpuscular Hemoglobin Conc 31.0 (L) 32.0 - 36.0 g/dL    RDW 17.3 (H) 11.5 - 14.5 %    Platelets 207 150 - 350 K/uL    MPV 10.5 9.2 - 12.9 fL    Immature Granulocytes CANCELED 0.0 - 0.5 %    Immature Grans (Abs) CANCELED 0.00 - 0.04 K/uL    nRBC 0 0 /100 WBC    Gran% 92.0 (H) 38.0 - 73.0 %    Lymph% 6.0 (L) 18.0 - 48.0 %    Mono% 1.0 (L) 4.0 - 15.0 %    Eosinophil% 0.0 0.0 - 8.0 %    Basophil% 0.0 0.0 - 1.9 %    Bands 1.0 %    Differential Method Manual    Comprehensive metabolic panel    Collection Time: 08/21/19  4:15 AM   Result Value Ref Range    Sodium 129 (L) 136 - 145 mmol/L    Potassium 4.3 3.5 - 5.1 mmol/L    Chloride 94 (L) 95 - 110 mmol/L    CO2 23 23 - 29 mmol/L    Glucose 191 (H) 70 - 110 mg/dL    BUN, Bld 72 (H) 8 - 23 mg/dL    Creatinine 2.0 (H) 0.5 - 1.4 mg/dL    Calcium 8.2 (L) 8.7 - 10.5 mg/dL    Total Protein 5.8 (L) 6.0 - 8.4 g/dL    Albumin 2.3 (L) 3.5 - 5.2 g/dL    Total Bilirubin 0.6 0.1 - 1.0 mg/dL    Alkaline Phosphatase 69 55 - 135 U/L  "   AST 13 10 - 40 U/L    ALT 18 10 - 44 U/L    Anion Gap 12 8 - 16 mmol/L    eGFR if African American 25.5 (A) >60 mL/min/1.73 m^2    eGFR if non  22.1 (A) >60 mL/min/1.73 m^2   TSH    Collection Time: 08/21/19  4:15 AM   Result Value Ref Range    TSH 0.740 0.340 - 5.600 uIU/mL   POCT glucose    Collection Time: 08/21/19  6:30 AM   Result Value Ref Range    POC Glucose 186 (H) 70 - 110       Diagnostic Results:  Imaging Results          X-Ray Chest AP Portable (Final result)  Result time 08/16/19 17:44:51    Final result by Floyd Wells MD (08/16/19 17:44:51)                 Impression:      Left lung base pleuroparenchymal opacity could reflect airspace disease, atelectasis, pleural effusion, or some combination thereof.  Follow-up PA and lateral chest radiography may be helpful.      Electronically signed by: Floyd Wells MD  Date:    08/16/2019  Time:    17:44             Narrative:    EXAMINATION:  XR CHEST AP PORTABLE    CLINICAL HISTORY:  Sepsis;    COMPARISON:  06/05/2019    FINDINGS:  Cardiac silhouette size is enlarged.  Atherosclerotic calcification of the aorta.  Left subclavian pacing leads are in stable position.  There is pleuroparenchymal opacity at the left lung base.  Right lung is clear.  No pneumothorax.  No acute osseous abnormality.                                Physical Exam:  Objective:  General Appearance:  Comfortable.    Vital signs: (most recent): Blood pressure (!) 145/61, pulse 78, temperature 98 °F (36.7 °C), temperature source Oral, resp. rate 18, height 5' 1" (1.549 m), weight 87.1 kg (192 lb), SpO2 96 %, not currently breastfeeding.    Lungs:  Normal effort and normal respiratory rate.  Breath sounds clear to auscultation.    Heart: Normal rate.  Regular rhythm.  S1 normal and S2 normal.  No gallop.   Abdomen: Abdomen is soft.  Bowel sounds are normal.     Extremities: There is no local swelling.        Current Consults:  IP CONSULT TO HOSPITAL MEDICINE  IP " CONSULT TO CARDIOLOGY  WOUND CARE CONSULT  WOUND CARE CONSULT  IP CONSULT TO SOCIAL WORK/CASE MANAGEMENT  IP CONSULT TO INFECTIOUS DISEASES  IP CONSULT TO NEPHROLOGY    Assessment/Plan:  Assessment:   1. Urosepsis  2. Hypotension   3. Afib with rvr on Eliquis/Sotalol/Toprol --now in sinus  4. DM   5. Polymyalgia rheumatica   6. Encephalopathy   7. Moderate AS GIOVANNA 1.3 mean gradient 22 mmHg     Plan:   Plan per nephrology. Clear for DC from cardiac standpoint. Cr improved but need long term plan concerning diuresis since patient was functioning well at home with IV Lasix twice weekly.   Increase Torpol to 25 mg daily. Remain off ARB/ACEi.   ECHO order fell off? Will re-order if nephrology feels it is necessary prior to discharge.   Monitor hemoglobin, goal > 8.5 with AF.    Titus Landrum PA-C  08/21/19

## 2019-08-21 NOTE — ASSESSMENT & PLAN NOTE
Multifactorial:From Hypervolemia,Hypotension associated SUJEY/ATN  Improving.  Nephrology on board, appreciate recs

## 2019-08-21 NOTE — PLAN OF CARE
Problem: Adult Inpatient Plan of Care  Goal: Plan of Care Review  Outcome: Ongoing (interventions implemented as appropriate)     08/21/19 4814   Plan of Care Review   Plan of Care Reviewed With patient   Progress improving   Outcome Summary continued on IV ABX, plans to d/c in the next day or so

## 2019-08-21 NOTE — SUBJECTIVE & OBJECTIVE
Interval History: diuretics were stopped yesterday    Review of Systems   Constitutional: Negative for chills and fever.   HENT: Positive for facial swelling. Negative for congestion.    Respiratory: Negative for shortness of breath and wheezing.    Cardiovascular: Negative for chest pain and palpitations.   Gastrointestinal: Negative for constipation, diarrhea, nausea and vomiting.   Genitourinary: Negative for dysuria, flank pain, urgency and vaginal discharge.   Musculoskeletal: Negative for back pain and myalgias.     Objective:     Vital Signs (Most Recent):  Temp: 98.4 °F (36.9 °C) (08/21/19 1134)  Pulse: 63 (08/21/19 1134)  Resp: 18 (08/21/19 1134)  BP: (!) 112/51 (08/21/19 1134)  SpO2: 95 % (08/21/19 1134) Vital Signs (24h Range):  Temp:  [97.5 °F (36.4 °C)-98.4 °F (36.9 °C)] 98.4 °F (36.9 °C)  Pulse:  [60-80] 63  Resp:  [18-19] 18  SpO2:  [95 %-98 %] 95 %  BP: (110-145)/(38-61) 112/51     Weight: 87.1 kg (192 lb)  Body mass index is 36.28 kg/m².    Intake/Output Summary (Last 24 hours) at 8/21/2019 1259  Last data filed at 8/21/2019 0833  Gross per 24 hour   Intake 360 ml   Output --   Net 360 ml      Physical Exam   Constitutional: She is oriented to person, place, and time. No distress.   Eyes: Pupils are equal, round, and reactive to light. No scleral icterus.   Cardiovascular: Normal rate and regular rhythm.   No murmur heard.  2+ bilateral pedal edema   Pulmonary/Chest: She has no wheezes. She has rales in the right lower field and the left lower field.   Abdominal: Soft. She exhibits no distension. There is no tenderness.   Neurological: She is alert and oriented to person, place, and time.   Skin: Skin is warm. Capillary refill takes less than 2 seconds. No rash noted.   Psychiatric: She has a normal mood and affect.   Nursing note and vitals reviewed.      Significant Labs:   BMP:   Recent Labs   Lab 08/20/19  0405 08/21/19  0415   * 191*   * 129*   K 3.5 4.3   CL 93* 94*   CO2 22* 23    BUN 73* 72*   CREATININE 2.4* 2.0*   CALCIUM 7.9* 8.2*   MG 1.9  --      CBC:   Recent Labs   Lab 08/20/19  0405 08/21/19  0415   WBC 7.43 6.91   HGB 8.9* 9.1*   HCT 29.1* 29.4*    207     CMP:   Recent Labs   Lab 08/20/19  0405 08/21/19  0415   * 129*   K 3.5 4.3   CL 93* 94*   CO2 22* 23   * 191*   BUN 73* 72*   CREATININE 2.4* 2.0*   CALCIUM 7.9* 8.2*   PROT 5.3* 5.8*   ALBUMIN 2.1* 2.3*   BILITOT 0.6 0.6   ALKPHOS 68 69   AST 16 13   ALT 23 18   ANIONGAP 12 12   EGFRNONAA 17.7* 22.1*       Significant Imaging: I have reviewed all pertinent imaging results/findings within the past 24 hours.

## 2019-08-21 NOTE — PLAN OF CARE
Problem: Fall Injury Risk  Goal: Absence of Fall and Fall-Related Injury    Intervention: Promote Injury-Free Environment  PT remains free from fall      Problem: Infection  Goal: Infection Symptom Resolution    Intervention: Prevent or Manage Infection  PT remains free from infection

## 2019-08-22 VITALS
HEART RATE: 83 BPM | BODY MASS INDEX: 37.12 KG/M2 | TEMPERATURE: 98 F | RESPIRATION RATE: 17 BRPM | SYSTOLIC BLOOD PRESSURE: 138 MMHG | DIASTOLIC BLOOD PRESSURE: 63 MMHG | WEIGHT: 196.63 LBS | HEIGHT: 61 IN | OXYGEN SATURATION: 95 %

## 2019-08-22 PROBLEM — N39.0 UTI (URINARY TRACT INFECTION): Status: RESOLVED | Noted: 2019-08-16 | Resolved: 2019-08-22

## 2019-08-22 PROBLEM — R79.89 ELEVATED TROPONIN: Status: RESOLVED | Noted: 2019-08-16 | Resolved: 2019-08-22

## 2019-08-22 PROBLEM — R78.81 GRAM-NEGATIVE BACTEREMIA: Status: RESOLVED | Noted: 2019-08-21 | Resolved: 2019-08-22

## 2019-08-22 LAB
ALBUMIN SERPL BCP-MCNC: 2.3 G/DL (ref 3.5–5.2)
ALP SERPL-CCNC: 71 U/L (ref 55–135)
ALT SERPL W/O P-5'-P-CCNC: 15 U/L (ref 10–44)
ANION GAP SERPL CALC-SCNC: 11 MMOL/L (ref 8–16)
AST SERPL-CCNC: 11 U/L (ref 10–40)
BASOPHILS NFR BLD: 0 % (ref 0–1.9)
BILIRUB SERPL-MCNC: 0.6 MG/DL (ref 0.1–1)
BUN SERPL-MCNC: 69 MG/DL (ref 8–23)
CALCIUM SERPL-MCNC: 8.2 MG/DL (ref 8.7–10.5)
CHLORIDE SERPL-SCNC: 98 MMOL/L (ref 95–110)
CO2 SERPL-SCNC: 24 MMOL/L (ref 23–29)
CREAT SERPL-MCNC: 1.7 MG/DL (ref 0.5–1.4)
DIFFERENTIAL METHOD: ABNORMAL
EOSINOPHIL NFR BLD: 0 % (ref 0–8)
ERYTHROCYTE [DISTWIDTH] IN BLOOD BY AUTOMATED COUNT: 17.2 % (ref 11.5–14.5)
EST. GFR  (AFRICAN AMERICAN): 31 ML/MIN/1.73 M^2
EST. GFR  (NON AFRICAN AMERICAN): 26.9 ML/MIN/1.73 M^2
GLUCOSE SERPL-MCNC: 176 MG/DL (ref 70–110)
GLUCOSE SERPL-MCNC: 186 MG/DL (ref 70–110)
GLUCOSE SERPL-MCNC: 186 MG/DL (ref 70–110)
GLUCOSE SERPL-MCNC: 188 MG/DL (ref 70–110)
HCT VFR BLD AUTO: 28.4 % (ref 37–48.5)
HGB BLD-MCNC: 8.6 G/DL (ref 12–16)
IMM GRANULOCYTES # BLD AUTO: ABNORMAL K/UL (ref 0–0.04)
IMM GRANULOCYTES NFR BLD AUTO: ABNORMAL % (ref 0–0.5)
LYMPHOCYTES NFR BLD: 14 % (ref 18–48)
MCH RBC QN AUTO: 29 PG (ref 27–31)
MCHC RBC AUTO-ENTMCNC: 30.3 G/DL (ref 32–36)
MCV RBC AUTO: 96 FL (ref 82–98)
MONOCYTES NFR BLD: 2 % (ref 4–15)
NEUTROPHILS NFR BLD: 80 % (ref 38–73)
NEUTS BAND NFR BLD MANUAL: 4 %
NRBC BLD-RTO: 0 /100 WBC
PLATELET # BLD AUTO: 210 K/UL (ref 150–350)
PMV BLD AUTO: 10.3 FL (ref 9.2–12.9)
POTASSIUM SERPL-SCNC: 3.7 MMOL/L (ref 3.5–5.1)
PROT SERPL-MCNC: 5.3 G/DL (ref 6–8.4)
RBC # BLD AUTO: 2.97 M/UL (ref 4–5.4)
SODIUM SERPL-SCNC: 133 MMOL/L (ref 136–145)
WBC # BLD AUTO: 4.63 K/UL (ref 3.9–12.7)

## 2019-08-22 PROCEDURE — 63600175 PHARM REV CODE 636 W HCPCS: Performed by: INTERNAL MEDICINE

## 2019-08-22 PROCEDURE — 80053 COMPREHEN METABOLIC PANEL: CPT

## 2019-08-22 PROCEDURE — 85007 BL SMEAR W/DIFF WBC COUNT: CPT

## 2019-08-22 PROCEDURE — 25000003 PHARM REV CODE 250: Performed by: PHYSICIAN ASSISTANT

## 2019-08-22 PROCEDURE — 94760 N-INVAS EAR/PLS OXIMETRY 1: CPT

## 2019-08-22 PROCEDURE — 25000003 PHARM REV CODE 250: Performed by: INTERNAL MEDICINE

## 2019-08-22 PROCEDURE — 36415 COLL VENOUS BLD VENIPUNCTURE: CPT

## 2019-08-22 PROCEDURE — 85027 COMPLETE CBC AUTOMATED: CPT

## 2019-08-22 PROCEDURE — 82962 GLUCOSE BLOOD TEST: CPT

## 2019-08-22 RX ORDER — CIPROFLOXACIN 500 MG/1
500 TABLET ORAL 2 TIMES DAILY
Qty: 8 TABLET | Refills: 0 | Status: SHIPPED | OUTPATIENT
Start: 2019-08-22 | End: 2019-08-26

## 2019-08-22 RX ADMIN — LEVOTHYROXINE SODIUM 150 MCG: 0.03 TABLET ORAL at 06:08

## 2019-08-22 RX ADMIN — LACTOBACILLUS TAB 1 TABLET: TAB at 08:08

## 2019-08-22 RX ADMIN — LACTOBACILLUS TAB 1 TABLET: TAB at 12:08

## 2019-08-22 RX ADMIN — PANTOPRAZOLE SODIUM 40 MG: 40 TABLET, DELAYED RELEASE ORAL at 06:08

## 2019-08-22 RX ADMIN — METOPROLOL SUCCINATE 25 MG: 25 TABLET, FILM COATED, EXTENDED RELEASE ORAL at 08:08

## 2019-08-22 RX ADMIN — CLOTRIMAZOLE AND BETAMETHASONE DIPROPIONATE 1 G: 10; .5 CREAM TOPICAL at 08:08

## 2019-08-22 RX ADMIN — GABAPENTIN 100 MG: 100 CAPSULE ORAL at 03:08

## 2019-08-22 RX ADMIN — GABAPENTIN 100 MG: 100 CAPSULE ORAL at 08:08

## 2019-08-22 RX ADMIN — APIXABAN 5 MG: 5 TABLET, FILM COATED ORAL at 08:08

## 2019-08-22 RX ADMIN — PREDNISONE 2 MG: 1 TABLET ORAL at 08:08

## 2019-08-22 RX ADMIN — SERTRALINE HYDROCHLORIDE 50 MG: 50 TABLET ORAL at 08:08

## 2019-08-22 RX ADMIN — POTASSIUM CHLORIDE 40 MEQ: 20 SOLUTION ORAL at 06:08

## 2019-08-22 RX ADMIN — PREDNISONE 2 MG: 1 TABLET ORAL at 10:08

## 2019-08-22 RX ADMIN — ASPIRIN 81 MG 81 MG: 81 TABLET ORAL at 08:08

## 2019-08-22 NOTE — PLAN OF CARE
08/22/19 1402   Discharge Assessment   Assessment Type Discharge Planning Reassessment   Discharge Plan A Home Health   Discharge info sent to [facility] via [facility] fax per patient choice to resume services with them.

## 2019-08-22 NOTE — PROGRESS NOTES
INPATIENT NEPHROLOGY PROGRESS NOTE  [facility] NEPHROLOGY    Patient Name: Vee Pizano  Date: 08/22/2019    Reason for consultation: SUJEY    Chief Complaint:   Chief Complaint   Patient presents with    Weakness       History of Present Illness:  Mrs. Pizano is a 86-year-old female who presented to the ED via EMS with chief complaint of AMS, generalized weakness and foul-smelling urine. We are consulted for SUJEY.    · Interval History/Subjective:    - afebrile, -130, on RA, UOP not recorded    · Review of Systems: sleeping    Plan of Care:    Assessment:  UTI  SUJEY 2/2 hypotension, volume depletion and arrhythmia  Hypotension/hx of CHF/Anasarca  AF with RVR  Hyponatremia  Anemia     Plan:    - She remains on IV antbx.  - Renal function is improving. Keep ARB and torsemide on hold at discharge. No NSAIDs or IV contrast.  - BP is better. VS is stable. Still advise UTD echo. Hold losartan/torsemide until renal function is back to baseline. Must adhere to a 2g salt diet. Need to monitor weight at home.   - She is rate controlled.  - Serum Na is better. Continue 1.5L fluid restriction at discharge.  - Hb is stable. Iron studies are c/w CKD. Advise procrit 50 units/kg biweekly as outpatient.     Needs renal panel on Monday.  Needs renal f/u within 2 weeks.  Discussed above with Dr. Castillo.    Thank you for allowing us to participate in this patient's care. We will continue to follow.    Medications:  No current facility-administered medications on file prior to encounter.      Current Outpatient Medications on File Prior to Encounter   Medication Sig Dispense Refill    allopurinol (ZYLOPRIM) 100 MG tablet Take 100 mg by mouth 2 (two) times daily.       apixaban (ELIQUIS) 5 mg Tab Take 5 mg by mouth 2 (two) times daily.      aspirin 81 MG Chew Take 81 mg by mouth every morning.       clotrimazole-betamethasone 1-0.05% (LOTRISONE) cream Apply 1 g topically 2 (two) times daily.      esomeprazole (NEXIUM) 40  MG capsule Take 40 mg by mouth every morning.       gabapentin (NEURONTIN) 100 MG capsule Take 1 capsule (100 mg total) by mouth 3 (three) times daily. 90 capsule 3    levothyroxine (SYNTHROID) 150 MCG tablet Take 150 mcg by mouth every morning.       losartan (COZAAR) 50 MG tablet Take 50 mg by mouth every morning.       metoprolol succinate (TOPROL-XL) 50 MG 24 hr tablet Take 50 mg by mouth every evening.      potassium chloride SA (K-DUR,KLOR-CON) 20 MEQ tablet Take 40 mEq by mouth 2 (two) times daily.      predniSONE (DELTASONE) 2.5 MG tablet Take 1 tablet (2.5 mg total) by mouth 3 (three) times daily. NEEDS APPT (Patient taking differently: Take 2.5 mg by mouth 3 (three) times daily. ) 90 tablet 1    sertraline (ZOLOFT) 50 MG tablet Take 50 mg by mouth every morning.      sotalol (BETAPACE) 80 MG tablet Take 80 mg by mouth 2 (two) times daily.       torsemide (DEMADEX) 20 MG Tab Take 20 mg by mouth 2 (two) times daily.      VITAMIN D2 50,000 unit capsule Take 50,000 Units by mouth every 7 days. ON MONDAYS      LANTUS U-100 INSULIN 100 unit/mL injection Inject 10 Units into the skin daily as needed.       NOVOLOG FLEXPEN U-100 INSULIN 100 unit/mL InPn pen Inject 5 Units into the skin 3 (three) times daily as needed (per sliding scale, hold for BGL<150).       traMADol (ULTRAM) 50 mg tablet Take 50 mg by mouth every 4 (four) hours as needed.        Scheduled Meds:   apixaban  5 mg Oral BID    aspirin  81 mg Oral QAM    cefTRIAXone (ROCEPHIN) IVPB  2 g Intravenous Q24H    clotrimazole-betamethasone 1-0.05%  1 g Topical (Top) BID    epoetin sarah beth-ebpx (RETACRIT) injection  50 Units/kg Subcutaneous Every Mon, Wed, Fri    gabapentin  100 mg Oral TID    Lactobacillus acidoph-L.bulgar  1 tablet Oral TID WM    levothyroxine  150 mcg Oral QAM    metoprolol succinate  25 mg Oral Daily    pantoprazole  40 mg Oral Daily    polyethylene glycol  17 g Oral Daily    predniSONE  2 mg Oral TID     sertraline  50 mg Oral QAM     Continuous Infusions:    PRN Meds:.acetaminophen, bisacodyl, dextrose 50%, dextrose 50%, glucagon (human recombinant), glucose, glucose, insulin aspart U-100, magnesium oxide, magnesium oxide, ondansetron, potassium chloride 10%, potassium chloride 10%, potassium chloride 10%, potassium, sodium phosphates, potassium, sodium phosphates, potassium, sodium phosphates, sodium chloride 0.9%, traMADol, trazodone    Allergies:  Patient has no known allergies.    Vital Signs:  Temp Readings from Last 3 Encounters:   08/22/19 98.3 °F (36.8 °C) (Oral)   04/03/18 99.2 °F (37.3 °C) (Oral)   06/08/13 99.3 °F (37.4 °C) (Oral)       Pulse Readings from Last 3 Encounters:   08/22/19 61   04/03/18 77   06/08/13 73       BP Readings from Last 3 Encounters:   08/22/19 (!) 105/43   04/03/18 124/63   03/26/18 100/62       Weight:  Wt Readings from Last 3 Encounters:   08/22/19 89.2 kg (196 lb 10.4 oz)   04/03/18 70.3 kg (155 lb)   03/26/18 69.9 kg (154 lb)       INS/OUTS:  I/O last 3 completed shifts:  In: 360 [P.O.:360]  Out: 1 [Stool:1]  No intake/output data recorded.    Physical Exam:  Constitutional: nad, sleeping, nontoxic, appears stated age  Heart: irreg irreg, no r/g, wwp, anasarca stable  Lungs: ant ausc clear, no w/r/r/c, no lb  Abdomen: s/nt/nd, +BS    Results:  Lab Results   Component Value Date     (L) 08/22/2019    K 3.7 08/22/2019    CL 98 08/22/2019    CO2 24 08/22/2019    BUN 69 (H) 08/22/2019    CREATININE 1.7 (H) 08/22/2019    CALCIUM 8.2 (L) 08/22/2019    ANIONGAP 11 08/22/2019    ESTGFRAFRICA 31.0 (A) 08/22/2019    EGFRNONAA 26.9 (A) 08/22/2019       Lab Results   Component Value Date    CALCIUM 8.2 (L) 08/22/2019    PHOS 4.0 08/17/2019       Recent Labs   Lab 08/22/19  0505   WBC 4.63   RBC 2.97*   HGB 8.6*   HCT 28.4*      MCV 96   MCH 29.0   MCHC 30.3*       I have personally reviewed pertinent radiological imaging and reports.    Pranay Flores MD MPH  [facility] [place]  [address] [place] [address]  [phone] (p)  [phone] (f)

## 2019-08-22 NOTE — PLAN OF CARE
08/22/19 1515   Discharge Reassessment   Assessment Type Discharge Planning Reassessment   Anticipated Discharge Disposition Home-Health   Rupal with [facility] verified acceptance of the patient .

## 2019-08-22 NOTE — DISCHARGE SUMMARY
[facility] Medicine  Discharge Summary      Patient Name: Vee Pizano  MRN: [id number]  Admission Date: 8/16/2019  Hospital Length of Stay: 6 days  Discharge Date and Time:  08/22/2019 1:31 PM  Attending Physician: Yancy Castillo MD   Discharging Provider: Yancy Castillo MD  Primary Care Provider: Noah Joseph MD      HPI:   Mrs. Pizano is a 86-year-old female who presented to the ED via EMS with chief complaint of generalized weakness and foul-smelling urine.  Collateral was obtained from daughters who were present at bedside.  Reported yesterday patient with increased somnolence and minimal oral intake associated with increased confusion and generalized weakness.  Daughters state these symptoms usually occur when patient has urinary tract infection, has a history of recurrent UTIs.  Patient has chronic indwelling graves catheter, changed once monthly by home health nurse, unsure date of most recent change.  Low-grade fever at home at 99 as per home health nurse.  On EMS arrival blood pressure was low 80/40 with heart rates up to the 150s.  In the ED patient with atrial fibrillation with rapid ventricular response, received 1 L LR with improvement in blood pressure. She was started on IV amiodarone bolus and infusion as per protocol.  Graves was changed and UA/urine culture was sent in the ED.  She received IV meropenem for empiric coverage as well as 2 g IV magnesium as was noted to have frequent PVCs on telemetry. On my review patient reports generalized weakness and shortness of breath, 's, .    * No surgery found *      Hospital Course:   Blood culture growing Gram negative rods  Atrial fibrillation with RVR is now rate controlled  She was treated with Iv antibiotics and then transiioned to oral cipro to complete 7 days of treatment for E. Coli bacteremia. Amiodarone will be stopd while she is on therapy     Consults:   Consults (From admission, onward)         Status Ordering Provider     Inpatient consult to Cardiology  Once     Provider:  Kin Goode MD    Completed JILLIAN JUSTICE     Inpatient consult to Hospitalist  Once     Provider:  Jillian Justice MD    Acknowledged JILLIAN JUSTICE     Inpatient consult to Infectious Diseases  Once     Provider:  Dilia Mcfadden MD    Acknowledged NAHEED MCGARRY     Inpatient consult to Nephrology  Once     Provider:  Pranay Flores MD    Completed JEREMIAH SAMPSON     IP consult to case management  Once     Provider:  (Not yet assigned)    Acknowledged JILLIAN JUSTICE          No new Assessment & Plan notes have been filed under this hospital service since the last note was generated.  Service: Hospital Medicine    Final Active Diagnoses:    Diagnosis Date Noted POA    SUJEY (acute kidney injury) [N17.9] 08/18/2019 No    Atrial fibrillation with RVR [I48.91] 08/16/2019 Yes    Hyponatremia [E87.1] 08/21/2019 Yes    Chronic diastolic heart failure [I50.32] 08/16/2019 Yes     Chronic    Paroxysmal atrial fibrillation with rapid ventricular response [I48.0] 08/19/2019 Yes    Anasarca [R60.1] 08/18/2019 Unknown    Generalized weakness on background of chronic debility [R53.1] 08/16/2019 Yes    Chronic venous stasis with right leg wound [I87.8] 08/16/2019 Yes    Sacral wound [S31.000A] 08/16/2019 Yes     Chronic    Advanced care planning/counseling discussion [Z71.89] 08/16/2019 Not Applicable    Polymyalgia rheumatica [M35.3] 08/16/2019 Yes     Chronic      Problems Resolved During this Admission:    Diagnosis Date Noted Date Resolved POA    PRINCIPAL PROBLEM:  Sepsis [A41.9] 08/16/2019 08/21/2019 Yes    Gram-negative bacteremia [R78.81] 08/21/2019 08/22/2019 No    UTI (urinary tract infection) [N39.0] 08/16/2019 08/22/2019 Yes    Hypotension [I95.9] 08/16/2019 08/19/2019 Yes    Infectious encephalopathy [G93.49, B99.9] 08/16/2019 08/19/2019 Yes    Wide-complex tachycardia [I47.2] 08/16/2019  08/19/2019 Yes     Chronic    Elevated troponin [R74.8] 08/16/2019 08/22/2019 Yes       Discharged Condition: good    Disposition: Home-Health Care Sv    Follow Up:  Follow-up Information     Pranay Flores MD In 2 weeks.    Specialty:  Nephrology  Contact information:  [address] [facility]  [place] [address]  [phone]             Kin Goode MD. Schedule an appointment as soon as possible for a visit in 2 weeks.    Specialties:  Cardiovascular Disease, INTERVENTIONAL CARDIOLOGY, Cardiology  Contact information:  Earn[address] SHERIFF [facility]  [address]  [facility]  [place] [address]  [phone]                 Patient Instructions:      Basic metabolic panel   Standing Status: Future Standing Exp. Date: 10/20/20     Referral to Home health   Referral Priority: Routine Referral Type: Home Health   Referral Reason: Specialty Services Required   Requested Specialty: Home Health Services   Number of Visits Requested: 1     Activity as tolerated       Significant Diagnostic Studies: Labs:   BMP:   Recent Labs   Lab 08/21/19  0415 08/22/19  0505   * 176*   * 133*   K 4.3 3.7   CL 94* 98   CO2 23 24   BUN 72* 69*   CREATININE 2.0* 1.7*   CALCIUM 8.2* 8.2*   , CMP   Recent Labs   Lab 08/21/19  0415 08/22/19  0505   * 133*   K 4.3 3.7   CL 94* 98   CO2 23 24   * 176*   BUN 72* 69*   CREATININE 2.0* 1.7*   CALCIUM 8.2* 8.2*   PROT 5.8* 5.3*   ALBUMIN 2.3* 2.3*   BILITOT 0.6 0.6   ALKPHOS 69 71   AST 13 11   ALT 18 15   ANIONGAP 12 11   ESTGFRAFRICA 25.5* 31.0*   EGFRNONAA 22.1* 26.9*   , CBC   Recent Labs   Lab 08/21/19  0415 08/22/19  0505   WBC 6.91 4.63   HGB 9.1* 8.6*   HCT 29.4* 28.4*    210   , Lipid Panel   Lab Results   Component Value Date    CHOL 152 09/25/2008    HDL 50 09/25/2008    LDLCALC 67.0 09/25/2008    TRIG 175 (H) 09/25/2008    CHOLHDL 32.9 09/25/2008    and A1C:   Recent Labs   Lab 08/16/19  2156   HGBA1C 8.3*     Microbiology:   Blood  Culture   Lab Results   Component Value Date    LABBLOO No Growth to date 08/19/2019    LABBLOO No Growth to date 08/19/2019    LABBLOO No Growth to date 08/19/2019    and Urine Culture    Lab Results   Component Value Date    LABURIN No growth 08/16/2019       Pending Diagnostic Studies:     None         Medications:  Reconciled Home Medications:      Medication List      START taking these medications    ciprofloxacin HCl 500 MG tablet  Commonly known as:  CIPRO  Take 1 tablet (500 mg total) by mouth 2 (two) times daily. for 4 days        CHANGE how you take these medications    predniSONE 2.5 MG tablet  Commonly known as:  DELTASONE  Take 1 tablet (2.5 mg total) by mouth 3 (three) times daily. NEEDS APPT  What changed:  additional instructions        CONTINUE taking these medications    allopurinol 100 MG tablet  Commonly known as:  ZYLOPRIM  Take 100 mg by mouth 2 (two) times daily.     aspirin 81 MG Chew  Take 81 mg by mouth every morning.     clotrimazole-betamethasone 1-0.05% cream  Commonly known as:  LOTRISONE  Apply 1 g topically 2 (two) times daily.     ELIQUIS 5 mg Tab  Generic drug:  apixaban  Take 5 mg by mouth 2 (two) times daily.     esomeprazole 40 MG capsule  Commonly known as:  NEXIUM  Take 40 mg by mouth every morning.     gabapentin 100 MG capsule  Commonly known as:  NEURONTIN  Take 1 capsule (100 mg total) by mouth 3 (three) times daily.     LANTUS U-100 INSULIN 100 unit/mL injection  Generic drug:  insulin glargine  Inject 10 Units into the skin daily as needed.     metoprolol succinate 50 MG 24 hr tablet  Commonly known as:  TOPROL-XL  Take 50 mg by mouth every evening.     NovoLOG Flexpen U-100 Insulin 100 unit/mL (3 mL) Inpn pen  Generic drug:  insulin aspart U-100  Inject 5 Units into the skin 3 (three) times daily as needed (per sliding scale, hold for BGL<150).     potassium chloride SA 20 MEQ tablet  Commonly known as:  K-DUR,KLOR-CON  Take 40 mEq by mouth 2 (two) times daily.      sertraline 50 MG tablet  Commonly known as:  ZOLOFT  Take 50 mg by mouth every morning.     sotalol 80 MG tablet  Commonly known as:  BETAPACE  Take 80 mg by mouth 2 (two) times daily.     SYNTHROID 150 MCG tablet  Generic drug:  levothyroxine  Take 150 mcg by mouth every morning.     traMADol 50 mg tablet  Commonly known as:  ULTRAM  Take 50 mg by mouth every 4 (four) hours as needed.     VITAMIN D2 50,000 unit Cap  Generic drug:  ergocalciferol  Take 50,000 Units by mouth every 7 days. ON MONDAYS        STOP taking these medications    losartan 50 MG tablet  Commonly known as:  COZAAR     torsemide 20 MG Tab  Commonly known as:  DEMADEX     TRUE METRIX GLUCOSE TEST STRIP Strp  Generic drug:  blood sugar diagnostic     TRUEPLUS LANCETS 28 gauge Misc  Generic drug:  lancets            Indwelling Lines/Drains at time of discharge:   Lines/Drains/Airways     Drain                 Urethral Catheter 08/16/19 1810 16 Fr. 5 days                Time spent on the discharge of patient: 40 minutes  Patient was seen and examined on the date of discharge and determined to be suitable for discharge.         Yancy Castillo MD  Department of Hospital Medicine  [facility]

## 2019-08-22 NOTE — PROGRESS NOTES
[facility]    Cardiology Progress Note    Subjective:  Seen and examined this morning. Telemetry pacing rhythm, no underlying A. Fib. Denies any active cardiac complaints, desires to go home.VSS. CR improved to 1.7. Hemoglobin 8.6.     Objective:  Vital Signs (Most Recent)  Temp: 98.3 °F (36.8 °C) (08/22/19 0749)  Pulse: 61 (08/22/19 0749)  Resp: 16 (08/22/19 0749)  BP: (!) 105/43 (08/22/19 0749)  SpO2: 96 % (08/22/19 0749)    Vital Signs Range (Last 24H):  Temp:  [97.8 °F (36.6 °C)-98.4 °F (36.9 °C)]   Pulse:  [61-82]   Resp:  [15-18]   BP: (105-133)/(43-69)   SpO2:  [93 %-97 %]     I & O (Last 24H):    Intake/Output Summary (Last 24 hours) at 8/22/2019 1010  Last data filed at 8/21/2019 1800  Gross per 24 hour   Intake --   Output 1 ml   Net -1 ml       Current Diet:     Current Diet Order   Procedures    Diet diabetic [facility]; 1800 Calorie; Standard Tray     Order Specific Question:   Indicate patient location for additional diet options:     Answer:   [facility]     Order Specific Question:   Total calories:     Answer:   1800 Calorie     Order Specific Question:   Tray type:     Answer:   Standard Tray        Allergies:  Review of patient's allergies indicates:  No Known Allergies    Meds:  Scheduled Meds:   apixaban  5 mg Oral BID    aspirin  81 mg Oral QAM    cefTRIAXone (ROCEPHIN) IVPB  2 g Intravenous Q24H    clotrimazole-betamethasone 1-0.05%  1 g Topical (Top) BID    epoetin sarah beth-ebpx (RETACRIT) injection  50 Units/kg Subcutaneous Every Mon, Wed, Fri    gabapentin  100 mg Oral TID    Lactobacillus acidoph-L.bulgar  1 tablet Oral TID WM    levothyroxine  150 mcg Oral QAM    metoprolol succinate  25 mg Oral Daily    pantoprazole  40 mg Oral Daily    polyethylene glycol  17 g Oral Daily    predniSONE  2 mg Oral TID    sertraline  50 mg Oral QAM     Continuous Infusions:    PRN Meds:acetaminophen, bisacodyl, dextrose 50%, dextrose 50%, glucagon (human recombinant), glucose, glucose, insulin  aspart U-100, magnesium oxide, magnesium oxide, ondansetron, potassium chloride 10%, potassium chloride 10%, potassium chloride 10%, potassium, sodium phosphates, potassium, sodium phosphates, potassium, sodium phosphates, sodium chloride 0.9%, traMADol, trazodone    Lab Results :  Recent Results (from the past 24 hour(s))   POCT glucose    Collection Time: 08/21/19 11:55 AM   Result Value Ref Range    POC Glucose 164 (H) 70 - 110   POCT glucose    Collection Time: 08/21/19  5:19 PM   Result Value Ref Range    POC Glucose 201 (H) 70 - 110   POCT glucose    Collection Time: 08/21/19  8:47 PM   Result Value Ref Range    POC Glucose 198 (H) 70 - 110   CBC auto differential    Collection Time: 08/22/19  5:05 AM   Result Value Ref Range    WBC 4.63 3.90 - 12.70 K/uL    RBC 2.97 (L) 4.00 - 5.40 M/uL    Hemoglobin 8.6 (L) 12.0 - 16.0 g/dL    Hematocrit 28.4 (L) 37.0 - 48.5 %    Mean Corpuscular Volume 96 82 - 98 fL    Mean Corpuscular Hemoglobin 29.0 27.0 - 31.0 pg    Mean Corpuscular Hemoglobin Conc 30.3 (L) 32.0 - 36.0 g/dL    RDW 17.2 (H) 11.5 - 14.5 %    Platelets 210 150 - 350 K/uL    MPV 10.3 9.2 - 12.9 fL    Immature Granulocytes CANCELED 0.0 - 0.5 %    Immature Grans (Abs) CANCELED 0.00 - 0.04 K/uL    nRBC 0 0 /100 WBC    Gran% 80.0 (H) 38.0 - 73.0 %    Lymph% 14.0 (L) 18.0 - 48.0 %    Mono% 2.0 (L) 4.0 - 15.0 %    Eosinophil% 0.0 0.0 - 8.0 %    Basophil% 0.0 0.0 - 1.9 %    Bands 4.0 %    Differential Method Manual    Comprehensive metabolic panel    Collection Time: 08/22/19  5:05 AM   Result Value Ref Range    Sodium 133 (L) 136 - 145 mmol/L    Potassium 3.7 3.5 - 5.1 mmol/L    Chloride 98 95 - 110 mmol/L    CO2 24 23 - 29 mmol/L    Glucose 176 (H) 70 - 110 mg/dL    BUN, Bld 69 (H) 8 - 23 mg/dL    Creatinine 1.7 (H) 0.5 - 1.4 mg/dL    Calcium 8.2 (L) 8.7 - 10.5 mg/dL    Total Protein 5.3 (L) 6.0 - 8.4 g/dL    Albumin 2.3 (L) 3.5 - 5.2 g/dL    Total Bilirubin 0.6 0.1 - 1.0 mg/dL    Alkaline Phosphatase 71 55 - 135  "U/L    AST 11 10 - 40 U/L    ALT 15 10 - 44 U/L    Anion Gap 11 8 - 16 mmol/L    eGFR if African American 31.0 (A) >60 mL/min/1.73 m^2    eGFR if non  26.9 (A) >60 mL/min/1.73 m^2   POCT glucose    Collection Time: 08/22/19  5:33 AM   Result Value Ref Range    POC Glucose 188 (H) 70 - 110       Diagnostic Results:  Imaging Results          X-Ray Chest AP Portable (Final result)  Result time 08/16/19 17:44:51    Final result by Floyd Wells MD (08/16/19 17:44:51)                 Impression:      Left lung base pleuroparenchymal opacity could reflect airspace disease, atelectasis, pleural effusion, or some combination thereof.  Follow-up PA and lateral chest radiography may be helpful.      Electronically signed by: Floyd Wells MD  Date:    08/16/2019  Time:    17:44             Narrative:    EXAMINATION:  XR CHEST AP PORTABLE    CLINICAL HISTORY:  Sepsis;    COMPARISON:  06/05/2019    FINDINGS:  Cardiac silhouette size is enlarged.  Atherosclerotic calcification of the aorta.  Left subclavian pacing leads are in stable position.  There is pleuroparenchymal opacity at the left lung base.  Right lung is clear.  No pneumothorax.  No acute osseous abnormality.                                Physical Exam:  Objective:  General Appearance:  Comfortable.    Vital signs: (most recent): Blood pressure (!) 105/43, pulse 61, temperature 98.3 °F (36.8 °C), temperature source Oral, resp. rate 16, height 5' 1" (1.549 m), weight 89.2 kg (196 lb 10.4 oz), SpO2 96 %, not currently breastfeeding.    Lungs:  Normal effort and normal respiratory rate.  Breath sounds clear to auscultation.    Heart: Normal rate.  Regular rhythm.  S1 normal and S2 normal.  No gallop.   Abdomen: Abdomen is soft.  Bowel sounds are normal.     Extremities: There is no local swelling.        Current Consults:  IP CONSULT TO HOSPITAL MEDICINE  IP CONSULT TO CARDIOLOGY  WOUND CARE CONSULT  WOUND CARE CONSULT  IP CONSULT TO SOCIAL " WORK/CASE MANAGEMENT  IP CONSULT TO INFECTIOUS DISEASES  IP CONSULT TO NEPHROLOGY    Assessment/Plan:  Assessment:   1. Urosepsis - resolving  2. Hypotension - resolving  3. Afib with rvr on Eliquis/Sotalol/Toprol --now in sinus  4. DM   5. Polymyalgia rheumatica   6. Encephalopathy   7. Moderate AS GIOVANNA 1.3 mean gradient 22 mmHg     Plan:   Plan per nephrology. Clear for DC from cardiac standpoint. Cr improved but need long term plan concerning diuresis since patient was functioning well at home with IV Lasix twice weekly.       Titus Landrum PA-C  08/22/19

## 2019-08-22 NOTE — PLAN OF CARE
08/22/19 1402   Discharge Reassessment   Assessment Type Discharge Planning Reassessment   Anticipated Discharge Disposition Home-Health

## 2019-08-22 NOTE — DISCHARGE INSTRUCTIONS
1. Continue 1.5L luid restrictions  2. Lbs will be drawn on Monday  3. Weigh yourself daily and record weight for nephrology and cardiology followup

## 2019-08-23 NOTE — NURSING
EMS  X 2 at patient's bedside to transfer patient to her home in [place]. Patient is awake and oriented to self, year and place. /63, P 83, R 17, T 97.7. Patient denies any pain or discomfort. Her personal belongings given to EMS staff. Pt escorted off unit via stretcher with EMS x 2 at her side.

## 2019-08-24 LAB — BACTERIA BLD CULT: NORMAL

## 2019-10-15 ENCOUNTER — TELEPHONE (OUTPATIENT)
Dept: HOME HEALTH SERVICES | Facility: HOSPITAL | Age: 84
End: 2019-10-15

## 2019-10-22 ENCOUNTER — EXTERNAL HOME HEALTH (OUTPATIENT)
Dept: HOME HEALTH SERVICES | Facility: HOSPITAL | Age: 84
End: 2019-10-22

## 2020-02-19 NOTE — ASSESSMENT & PLAN NOTE
Plan of management per CARDIOLOGY (Dr. Daigle)  Preload and afterload reduction [ LASIX, NORVASC]  Dietary salt restriction.  Alcohol and tobacco avoidance.   Gram negative sepsis - blood culture growing E.coli  On iv meropenem - will de-escalate antibiotics per culture sensitivities  Will consult KAYLA ANDREWS  Repeat blood culture